# Patient Record
Sex: FEMALE | Race: WHITE | Employment: OTHER | URBAN - METROPOLITAN AREA
[De-identification: names, ages, dates, MRNs, and addresses within clinical notes are randomized per-mention and may not be internally consistent; named-entity substitution may affect disease eponyms.]

---

## 2021-10-08 ENCOUNTER — HOSPITAL ENCOUNTER (EMERGENCY)
Age: 86
Discharge: HOME | End: 2021-10-08
Attending: EMERGENCY MEDICINE
Payer: MEDICARE

## 2021-10-08 ENCOUNTER — APPOINTMENT (OUTPATIENT)
Dept: GENERAL RADIOLOGY | Age: 86
End: 2021-10-08
Attending: EMERGENCY MEDICINE
Payer: MEDICARE

## 2021-10-08 ENCOUNTER — APPOINTMENT (OUTPATIENT)
Dept: CT IMAGING | Age: 86
End: 2021-10-08
Attending: EMERGENCY MEDICINE
Payer: MEDICARE

## 2021-10-08 VITALS
HEIGHT: 63 IN | WEIGHT: 123.02 LBS | OXYGEN SATURATION: 97 % | BODY MASS INDEX: 21.8 KG/M2 | SYSTOLIC BLOOD PRESSURE: 159 MMHG | RESPIRATION RATE: 20 BRPM | DIASTOLIC BLOOD PRESSURE: 56 MMHG | TEMPERATURE: 97.5 F | HEART RATE: 68 BPM
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
## 2021-10-08 LAB
ALBUMIN SERPL-MCNC: 3.8 G/DL (ref 3.5–5)
ALBUMIN/GLOB SERPL: 1.1 {RATIO} (ref 1.1–2.2)
ALP SERPL-CCNC: 65 U/L (ref 45–117)
ALT SERPL-CCNC: 15 U/L (ref 12–78)
ANION GAP SERPL CALC-SCNC: 6 MMOL/L (ref 5–15)
APPEARANCE UR: ABNORMAL
AST SERPL-CCNC: 20 U/L (ref 15–37)
ATRIAL RATE: 63 BPM
BACTERIA URNS QL MICRO: NEGATIVE /HPF
BASOPHILS # BLD: 0.1 K/UL (ref 0–0.1)
BASOPHILS NFR BLD: 1 % (ref 0–1)
BILIRUB SERPL-MCNC: 0.6 MG/DL (ref 0.2–1)
BILIRUB UR QL: NEGATIVE
BUN SERPL-MCNC: 15 MG/DL (ref 6–20)
BUN/CREAT SERPL: 25 (ref 12–20)
CALCIUM SERPL-MCNC: 9.9 MG/DL (ref 8.5–10.1)
CALCULATED P AXIS, ECG09: 62 DEGREES
CALCULATED R AXIS, ECG10: 30 DEGREES
CALCULATED T AXIS, ECG11: 52 DEGREES
CHLORIDE SERPL-SCNC: 110 MMOL/L (ref 97–108)
CO2 SERPL-SCNC: 25 MMOL/L (ref 21–32)
COLOR UR: ABNORMAL
COMMENT, HOLDF: NORMAL
CREAT SERPL-MCNC: 0.61 MG/DL (ref 0.55–1.02)
DIAGNOSIS, 93000: NORMAL
DIFFERENTIAL METHOD BLD: ABNORMAL
EOSINOPHIL # BLD: 0.2 K/UL (ref 0–0.4)
EOSINOPHIL NFR BLD: 3 % (ref 0–7)
EPITH CASTS URNS QL MICRO: ABNORMAL /LPF
ERYTHROCYTE [DISTWIDTH] IN BLOOD BY AUTOMATED COUNT: 13.7 % (ref 11.5–14.5)
GLOBULIN SER CALC-MCNC: 3.5 G/DL (ref 2–4)
GLUCOSE SERPL-MCNC: 111 MG/DL (ref 65–100)
GLUCOSE UR STRIP.AUTO-MCNC: NEGATIVE MG/DL
HCT VFR BLD AUTO: 39.1 % (ref 35–47)
HGB BLD-MCNC: 12.7 G/DL (ref 11.5–16)
HGB UR QL STRIP: NEGATIVE
HYALINE CASTS URNS QL MICRO: ABNORMAL /LPF (ref 0–5)
IMM GRANULOCYTES # BLD AUTO: 0 K/UL (ref 0–0.04)
IMM GRANULOCYTES NFR BLD AUTO: 0 % (ref 0–0.5)
KETONES UR QL STRIP.AUTO: ABNORMAL MG/DL
LEUKOCYTE ESTERASE UR QL STRIP.AUTO: NEGATIVE
LYMPHOCYTES # BLD: 1 K/UL (ref 0.8–3.5)
LYMPHOCYTES NFR BLD: 14 % (ref 12–49)
MCH RBC QN AUTO: 30.3 PG (ref 26–34)
MCHC RBC AUTO-ENTMCNC: 32.5 G/DL (ref 30–36.5)
MCV RBC AUTO: 93.3 FL (ref 80–99)
MONOCYTES # BLD: 0.4 K/UL (ref 0–1)
MONOCYTES NFR BLD: 6 % (ref 5–13)
NEUTS SEG # BLD: 5.3 K/UL (ref 1.8–8)
NEUTS SEG NFR BLD: 76 % (ref 32–75)
NITRITE UR QL STRIP.AUTO: NEGATIVE
NRBC # BLD: 0 K/UL (ref 0–0.01)
NRBC BLD-RTO: 0 PER 100 WBC
P-R INTERVAL, ECG05: 176 MS
PH UR STRIP: 7.5 [PH] (ref 5–8)
PLATELET # BLD AUTO: 210 K/UL (ref 150–400)
PMV BLD AUTO: 11 FL (ref 8.9–12.9)
POTASSIUM SERPL-SCNC: 4.3 MMOL/L (ref 3.5–5.1)
PROT SERPL-MCNC: 7.3 G/DL (ref 6.4–8.2)
PROT UR STRIP-MCNC: NEGATIVE MG/DL
Q-T INTERVAL, ECG07: 418 MS
QRS DURATION, ECG06: 76 MS
QTC CALCULATION (BEZET), ECG08: 427 MS
RBC # BLD AUTO: 4.19 M/UL (ref 3.8–5.2)
RBC #/AREA URNS HPF: ABNORMAL /HPF (ref 0–5)
SAMPLES BEING HELD,HOLD: NORMAL
SODIUM SERPL-SCNC: 141 MMOL/L (ref 136–145)
SP GR UR REFRACTOMETRY: 1.01 (ref 1–1.03)
TROPONIN-HIGH SENSITIVITY: 10 NG/L (ref 0–51)
TROPONIN-HIGH SENSITIVITY: 11 NG/L (ref 0–51)
UR CULT HOLD, URHOLD: NORMAL
UROBILINOGEN UR QL STRIP.AUTO: 0.2 EU/DL (ref 0.2–1)
VENTRICULAR RATE, ECG03: 63 BPM
WBC # BLD AUTO: 6.9 K/UL (ref 3.6–11)
WBC URNS QL MICRO: ABNORMAL /HPF (ref 0–4)

## 2021-10-08 PROCEDURE — 93005 ELECTROCARDIOGRAM TRACING: CPT

## 2021-10-08 PROCEDURE — 80053 COMPREHEN METABOLIC PANEL: CPT

## 2021-10-08 PROCEDURE — 70450 CT HEAD/BRAIN W/O DYE: CPT

## 2021-10-08 PROCEDURE — 73610 X-RAY EXAM OF ANKLE: CPT

## 2021-10-08 PROCEDURE — 99285 EMERGENCY DEPT VISIT HI MDM: CPT

## 2021-10-08 PROCEDURE — 81001 URINALYSIS AUTO W/SCOPE: CPT

## 2021-10-08 PROCEDURE — 72125 CT NECK SPINE W/O DYE: CPT

## 2021-10-08 PROCEDURE — 71045 X-RAY EXAM CHEST 1 VIEW: CPT

## 2021-10-08 PROCEDURE — 36415 COLL VENOUS BLD VENIPUNCTURE: CPT

## 2021-10-08 PROCEDURE — 85025 COMPLETE CBC W/AUTO DIFF WBC: CPT

## 2021-10-08 PROCEDURE — 84484 ASSAY OF TROPONIN QUANT: CPT

## 2021-10-08 NOTE — ED NOTES
This is a 66-year-old female patient who came into the ER with a complaint of being found on the floor today. The patient was also reported to have \"had a vagal response at home\" on Sept 17th, where the patient \"went limp\" and was cold. The patient was taken to the [facility] for that episode.

## 2021-10-08 NOTE — ED TRIAGE NOTES
Pt arrives via EMS after a family found her on the floor next to a lamp that been knocked down. They report she had a fall at some point and was found around 9am this morning. Unsure how long she was down, if she hit her head or if she lost consciousness. Pt only take 81 mg ASA. Pt reports she is hurting but unsure where. Pt has hx of dementia. Pts son at bedside. Pts baselines alert to self. PTs son reports dementia has gotten worse over the last couple of months.

## 2021-10-08 NOTE — ED PROVIDER NOTES
The history is provided by the patient and a relative. The history is limited by the condition of the patient. No  was used. Fall  The accident occurred 1 to 2 hours ago. The fall occurred in unknown circumstances. She fell from a height of ground level. She landed on carpet. There was no blood loss. She was not ambulatory at the scene. There was no entrapment after the fall. There was no alcohol use involved in the accident. The risk factors include dementia and being elderly. She has tried nothing for the symptoms. The treatment provided no relief. It is unknown when the patient last had a tetanus shot. No past medical history on file. No past surgical history on file. No family history on file. Social History     Socioeconomic History    Marital status:      Spouse name: Not on file    Number of children: Not on file    Years of education: Not on file    Highest education level: Not on file   Occupational History    Not on file   Tobacco Use    Smoking status: Not on file   Substance and Sexual Activity    Alcohol use: Not on file    Drug use: Not on file    Sexual activity: Not on file   Other Topics Concern    Not on file   Social History Narrative    Not on file     Social Determinants of Health     Financial Resource Strain:     Difficulty of Paying Living Expenses:    Food Insecurity:     Worried About Running Out of Food in the Last Year:     [address] in the Last Year:    Transportation Needs:     Lack of Transportation (Medical):      Lack of Transportation (Non-Medical):    Physical Activity:     Days of Exercise per Week:     Minutes of Exercise per Session:    Stress:     Feeling of Stress :    Social Connections:     Frequency of Communication with Friends and Family:     Frequency of Social Gatherings with Friends and Family:     Attends Restoration[employer] Services:     Active Member of Clubs or Organizations:     Attends Club or Organization Meetings:     Marital Status:    Intimate Partner Violence:     Fear of Current or Ex-Partner:     Emotionally Abused:     Physically Abused:     Sexually Abused: ALLERGIES: Patient has no known allergies. Review of Systems   Unable to perform ROS: Dementia       Vitals:    10/08/21 1018 10/08/21 1025   BP: (!) 180/72    Pulse: 61    Resp: 22    Temp: 97.5 °F (36.4 °C)    SpO2: 98% 98%   Weight: 95.7 kg (211 lb)             Physical Exam  Vitals and nursing note reviewed. Constitutional:       General: She is not in acute distress. Appearance: She is well-developed. She is not diaphoretic. HENT:      Head: Normocephalic and atraumatic. Eyes:      Pupils: Pupils are equal, round, and reactive to light. Cardiovascular:      Rate and Rhythm: Normal rate and regular rhythm. Heart sounds: Normal heart sounds. No murmur heard. No friction rub. No gallop. Pulmonary:      Effort: Pulmonary effort is normal. No respiratory distress. Breath sounds: Normal breath sounds. No wheezing. Abdominal:      General: Bowel sounds are normal. There is no distension. Palpations: Abdomen is soft. Tenderness: There is no abdominal tenderness. There is no guarding or rebound. Musculoskeletal:      Cervical back: Normal range of motion and neck supple. Left ankle: No swelling, deformity, ecchymosis or lacerations. Tenderness present. Normal range of motion. Skin:     General: Skin is warm. Findings: No rash. Neurological:      Mental Status: She is alert and oriented to person, place, and time. Psychiatric:         Behavior: Behavior normal.         Thought Content: Thought content normal.         Judgment: Judgment normal.          MDM     This is a 80-year-old female with past medical history, review of systems, physical exam as above, presenting with complaints of ground-level fall.   Per son at bedside, patient lives with them, history of dementia, noted to having increasing weakness and confusion over the last several days. He states his mother was found on her back in her bedroom, and it appeared she had pulled a lamp down with her as she went to the floor. She was reported to be awake and alert, without complaints, questionable hip pain when moved by EMS. Upon arrival patient awake and alert, at baseline per her son, hard of hearing and demented, making review of systems limited, however she denies complaints. Physical exam remarkable for well-appearing elderly female, in no acute distress noted to be hypertensive, afebrile without tachycardia, satting well on room air. Free range of motion without pain or crepitus with the exception of left ankle with pain with range of motion, without obvious deformity, ecchymosis, or crepitus. She is noted to have tenderness over the suprapubic space stating \"I got a pee\". She has an otherwise benign abdomen, clear breath sounds, regular rate and rhythm without murmurs gallops rubs, pupils equal and reactive, extraocular movements intact. No inducible hip pain upon range of motion bilateral hips or palpation. Plan to obtain CMP, CBC, EKG, cardiac enzymes, UA, chest x-ray, head CT, cervical spine CT. Left ankle films. We will reassess, and make a disposition.     Procedures

## 2021-10-26 ENCOUNTER — TELEPHONE (OUTPATIENT)
Dept: INTERNAL MEDICINE CLINIC | Age: 86
End: 2021-10-26

## 2021-10-26 NOTE — TELEPHONE ENCOUNTER
FYI: PSR reached out to patient to reschedule her new patient appointment - I ended up speaking to her daughter in law who is providing transportation [[email or web address]]. Mally Messina did not answer so I left a detailed VM about moving new patient appointment to Nov 05 at 3 PM - if she returns the call please assist in rescheduling.

## 2021-11-05 ENCOUNTER — OFFICE VISIT (OUTPATIENT)
Dept: INTERNAL MEDICINE CLINIC | Age: 86
End: 2021-11-05
Payer: MEDICARE

## 2021-11-05 VITALS
WEIGHT: 127 LBS | OXYGEN SATURATION: 97 % | HEIGHT: 63 IN | DIASTOLIC BLOOD PRESSURE: 64 MMHG | RESPIRATION RATE: 13 BRPM | BODY MASS INDEX: 22.5 KG/M2 | SYSTOLIC BLOOD PRESSURE: 134 MMHG | TEMPERATURE: 97.2 F | HEART RATE: 83 BPM

## 2021-11-05 DIAGNOSIS — M81.0 AGE RELATED OSTEOPOROSIS, UNSPECIFIED PATHOLOGICAL FRACTURE PRESENCE: ICD-10-CM

## 2021-11-05 DIAGNOSIS — R32 URINARY INCONTINENCE, UNSPECIFIED TYPE: ICD-10-CM

## 2021-11-05 DIAGNOSIS — R06.09 DYSPNEA ON EXERTION: ICD-10-CM

## 2021-11-05 DIAGNOSIS — Z86.19 HISTORY OF ONYCHOMYCOSIS: ICD-10-CM

## 2021-11-05 DIAGNOSIS — R10.2 VAGINAL PAIN: ICD-10-CM

## 2021-11-05 DIAGNOSIS — G30.1 LATE ONSET ALZHEIMER'S DEMENTIA WITHOUT BEHAVIORAL DISTURBANCE (HCC): ICD-10-CM

## 2021-11-05 DIAGNOSIS — R55 NEAR SYNCOPE: ICD-10-CM

## 2021-11-05 DIAGNOSIS — U07.1 COVID-19: ICD-10-CM

## 2021-11-05 DIAGNOSIS — R01.1 MURMUR: Primary | ICD-10-CM

## 2021-11-05 DIAGNOSIS — E21.3 HYPERPARATHYROIDISM (HCC): ICD-10-CM

## 2021-11-05 DIAGNOSIS — H35.30 MACULAR DEGENERATION, UNSPECIFIED LATERALITY, UNSPECIFIED TYPE: ICD-10-CM

## 2021-11-05 DIAGNOSIS — F02.80 LATE ONSET ALZHEIMER'S DEMENTIA WITHOUT BEHAVIORAL DISTURBANCE (HCC): ICD-10-CM

## 2021-11-05 PROCEDURE — G8420 CALC BMI NORM PARAMETERS: HCPCS | Performed by: INTERNAL MEDICINE

## 2021-11-05 PROCEDURE — 1090F PRES/ABSN URINE INCON ASSESS: CPT | Performed by: INTERNAL MEDICINE

## 2021-11-05 PROCEDURE — G8427 DOCREV CUR MEDS BY ELIG CLIN: HCPCS | Performed by: INTERNAL MEDICINE

## 2021-11-05 PROCEDURE — G8536 NO DOC ELDER MAL SCRN: HCPCS | Performed by: INTERNAL MEDICINE

## 2021-11-05 PROCEDURE — G8510 SCR DEP NEG, NO PLAN REQD: HCPCS | Performed by: INTERNAL MEDICINE

## 2021-11-05 PROCEDURE — 99204 OFFICE O/P NEW MOD 45 MIN: CPT | Performed by: INTERNAL MEDICINE

## 2021-11-05 PROCEDURE — G0463 HOSPITAL OUTPT CLINIC VISIT: HCPCS | Performed by: INTERNAL MEDICINE

## 2021-11-05 PROCEDURE — 1101F PT FALLS ASSESS-DOCD LE1/YR: CPT | Performed by: INTERNAL MEDICINE

## 2021-11-05 RX ORDER — DOCUSATE SODIUM 100 MG/1
100 CAPSULE, LIQUID FILLED ORAL DAILY
COMMUNITY
End: 2022-09-08

## 2021-11-05 RX ORDER — CHOLECALCIFEROL (VITAMIN D3) 125 MCG
CAPSULE ORAL DAILY
COMMUNITY

## 2021-11-05 RX ORDER — ALENDRONATE SODIUM 70 MG/1
TABLET ORAL
COMMUNITY
End: 2022-09-08

## 2021-11-05 RX ORDER — ASPIRIN 81 MG/1
TABLET ORAL DAILY
COMMUNITY
End: 2022-09-08

## 2021-11-05 RX ORDER — ASCORBIC ACID 250 MG
TABLET ORAL
COMMUNITY
End: 2022-09-08

## 2021-11-05 NOTE — ASSESSMENT & PLAN NOTE
Daughter reports that when she helps clean the patient, she will occasionally complain of tenderness to her vaginal area. She was previously prescribed Premarin cream but started bleeding so they stopped. Daughter reports recently nearing target discharge on her depends. She does not think this is blood. Recommend monitoring.   If discharge persists, advised to let us know for further evaluation

## 2021-11-05 NOTE — ASSESSMENT & PLAN NOTE
Had an episode in September where she was having a lot of diarrhea and started feeling lightheaded, dizzy, warm. Heart rate noted at that time to be 49. She was seen in the emergency room with negative work-up and was diagnosed as vasovagal.  A few weeks later, she was brought to the ER again after being found on the floor near her bed. Work-up was also negative. Unclear etiology. Recommend maintaining hydration. Does have a murmur that has not been mentioned before. Recommend checking echo.

## 2021-11-05 NOTE — ASSESSMENT & PLAN NOTE
Daughter reports noting dyspnea on exertion over the past couple months. She was diagnosed with Covid in January and was doing well until about 2 months ago when her physical therapy stopped. After therapy stopped, she has been less active and when she is active, she seems more short of breath. Reports an occasional cough. Denies lower extremity swelling, wheezing. Likely related to deconditioning. However, given possible new murmur, will check echo. Otherwise, we discussed whether to pursue further work-up. Daughter-in-law okay with holding off for now.

## 2021-11-05 NOTE — ASSESSMENT & PLAN NOTE
After her admission for Covid in January, she started living with her son and daughter-in-law. They started noticing memory deficits. They did not have much contact with her during [place] so they are not sure how quickly this may have developed. They had initially attributed this to hearing issues. She eventually saw Dr. Yesi Hernandez with neurology and was diagnosed with Alzheimer's. She was initially given donepezil but that caused dizziness so they stopped the medication. Reports noting some swallowing issues and has been seen by speech therapy and they think this is related to cognitive issues.   Monitor

## 2021-11-05 NOTE — PROGRESS NOTES
Assessment and Plan     Accompanied by her daughter in law Zara Parmar. Majority of history obtained from [facility] - pt extremely hard of hearing    1. Murmur  Assessment & Plan:  Systolic murmur noted on exam.  Daughter-in-law denies anyone mentioning this in previous visits. Daughter-in-law reports that she will occasionally complain of feeling dizzy in the morning. Given her history of recent dyspnea on exertion and vasovagal symptoms, recommend checking echo  Orders:  -     ECHO ADULT COMPLETE; Future  2. Near syncope  Assessment & Plan:  Had an episode in September where she was having a lot of diarrhea and started feeling lightheaded, dizzy, warm. Heart rate noted at that time to be 49. She was seen in the emergency room with negative work-up and was diagnosed as vasovagal.  A few weeks later, she was brought to the ER again after being found on the floor near her bed. Work-up was also negative. Unclear etiology. Recommend maintaining hydration. Does have a murmur that has not been mentioned before. Recommend checking echo. 3. Dyspnea on exertion  Assessment & Plan:  Daughter reports noting dyspnea on exertion over the past couple months. She was diagnosed with Covid in January and was doing well until about 2 months ago when her physical therapy stopped. After therapy stopped, she has been less active and when she is active, she seems more short of breath. Reports an occasional cough. Denies lower extremity swelling, wheezing. Likely related to deconditioning. However, given possible new murmur, will check echo. Otherwise, we discussed whether to pursue further work-up. Daughter-in-law okay with holding off for now. 4. COVID-19  Assessment & Plan:  Diagnosed with Covid in January. Did require admission due to dehydration.   5. Late onset Alzheimer's dementia without behavioral disturbance [facility])  Assessment & Plan:  After her admission for Covid in January, she started living with her son and daughter-in-law. They started noticing memory deficits. They did not have much contact with her during [place] so they are not sure how quickly this may have developed. They had initially attributed this to hearing issues. She eventually saw Dr. Bhanu Capps with neurology and was diagnosed with Alzheimer's. She was initially given donepezil but that caused dizziness so they stopped the medication. Reports noting some swallowing issues and has been seen by speech therapy and they think this is related to cognitive issues. Monitor  6. Macular degeneration, unspecified laterality, unspecified type  Assessment & Plan:  Receives injections. Followed by ophthalmology  7. Vaginal pain  Assessment & Plan:  Daughter reports that when she helps clean the patient, she will occasionally complain of tenderness to her vaginal area. She was previously prescribed Premarin cream but started bleeding so they stopped. Daughter reports recently nearing target discharge on her depends. She does not think this is blood. Recommend monitoring. If discharge persists, advised to let us know for further evaluation  8. Hyperparathyroidism [facility])  Assessment & Plan:  Followed by endocrinology. Continue to monitor. They are planning on a bone density scan. 9. Age related osteoporosis, unspecified pathological fracture presence  Assessment & Plan:  Planning for bone density scan soon with Dr. Jeremy Mario. Continue alendronate 70 mg weekly  10. History of onychomycosis  Assessment & Plan:  Previously seen by podiatry and was treated for onychomycosis    11. Urinary incontinence, unspecified type  Assessment & Plan:  Occasional, uses depends       Benefits, risks, possible drug interactions, and side effects of all new medications were reviewed with the patient. Pt verbalized understanding. Return to clinic:  Pending workup    An electronic signature was used to authenticate this note.   Zuleika Loomis MD  Internal Medicine [facility]  11/5/2021    No future appointments. History of Present Illness   Chief Complaint   establish care    Rodo Carlos is a 80 y.o. female         Review of Systems   Constitutional: Negative for chills and fever. HENT: Positive for hearing loss. Eyes: Negative for blurred vision. Respiratory: Positive for shortness of breath. Cardiovascular: Negative for chest pain. Gastrointestinal: Negative for abdominal pain, blood in stool, constipation, diarrhea, melena, nausea and vomiting. Genitourinary: Negative for dysuria and hematuria. Musculoskeletal: Negative for joint pain. Skin: Negative for rash. Neurological: Negative for headaches. Past Medical History     Allergies   Allergen Reactions    Penicillamine Other (comments)        Current Outpatient Medications   Medication Sig    alendronate (FOSAMAX) 70 mg tablet Take  by mouth.  docusate sodium (COLACE) 100 mg capsule Take 100 mg by mouth daily.  cholecalciferol, vitamin D3, 50 mcg (2,000 unit) tab Take  by mouth daily.  aspirin delayed-release 81 mg tablet Take  by mouth daily.  vit A/vit C/vit E/zinc/copper (PRESERVISION AREDS PO) Take  by mouth.  B.infantis-B.ani-B.long-B.bifi 10-15 mg TbEC Take  by mouth.  ascorbic acid, vitamin C, (VITAMIN C) 250 mg tablet Take  by mouth.  FIBER CHOICE PO Take  by mouth every evening. No current facility-administered medications for this visit. Patient Active Problem List   Diagnosis Code    Murmur R01.1    COVID-19 U07.1    Late onset Alzheimer's dementia without behavioral disturbance ([address] G30.1, F02.80    Macular degeneration H35.30    Vaginal pain R10.2    Hyperparathyroidism ([address] E21.3    Osteoporosis M81.0    History of onychomycosis Z86.19    Urinary incontinence R32    Dyspnea on exertion R06.00    Near syncope R55     History reviewed. No pertinent surgical history.    Social History     Tobacco Use    Smoking status: Never Smoker    Smokeless tobacco: Never Used   Substance Use Topics    Alcohol use: Not Currently      Family History   Problem Relation Age of Onset    Dementia Other     Diabetes Other         Physical Exam   Vitals:       Visit Vitals  /64   Pulse 83   Temp 97.2 °F (36.2 °C) (Temporal)   Resp 13   Ht 5' 3\" (1.6 m)   Wt 127 lb (57.6 kg)   SpO2 97%   BMI 22.50 kg/m²        Physical Exam  Constitutional:       General: She is not in acute distress. Appearance: She is well-developed. HENT:      Right Ear: External ear normal. There is impacted cerumen. Left Ear: External ear normal. There is impacted cerumen. Eyes:      Extraocular Movements: Extraocular movements intact. Conjunctiva/sclera: Conjunctivae normal.   Cardiovascular:      Rate and Rhythm: Normal rate and regular rhythm. Pulses: Normal pulses. Heart sounds: Murmur (3/6 systolic murmur) heard. No friction rub. No gallop. Pulmonary:      Effort: No respiratory distress. Breath sounds: No wheezing, rhonchi or rales. Abdominal:      General: Bowel sounds are normal. There is no distension. Palpations: Abdomen is soft. There is no hepatomegaly, splenomegaly or mass. Tenderness: There is no abdominal tenderness. There is no guarding. Musculoskeletal:      Cervical back: Neck supple. Skin:     General: Skin is warm. Findings: No rash. Neurological:      Mental Status: She is alert.

## 2022-02-15 ENCOUNTER — HOSPITAL ENCOUNTER (OUTPATIENT)
Dept: NON INVASIVE DIAGNOSTICS | Age: 87
Discharge: HOME | End: 2022-02-15
Attending: INTERNAL MEDICINE
Payer: MEDICARE

## 2022-02-15 VITALS
BODY MASS INDEX: 22.5 KG/M2 | HEIGHT: 63 IN | WEIGHT: 127 LBS | SYSTOLIC BLOOD PRESSURE: 134 MMHG | DIASTOLIC BLOOD PRESSURE: 64 MMHG

## 2022-02-15 DIAGNOSIS — R01.1 MURMUR: ICD-10-CM

## 2022-02-15 LAB
ECHO AO ROOT DIAM: 3.2 CM
ECHO AO ROOT INDEX: 2.01 CM/M2
ECHO AV AREA PEAK VELOCITY: 2.5 CM2
ECHO AV AREA PEAK VELOCITY: 2.5 CM2
ECHO AV PEAK GRADIENT: 10 MMHG
ECHO AV PEAK VELOCITY: 1.6 M/S
ECHO AV VELOCITY RATIO: 0.69
ECHO EST RA PRESSURE: 8 MMHG
ECHO LA DIAMETER INDEX: 2.45 CM/M2
ECHO LA DIAMETER: 3.9 CM
ECHO LA TO AORTIC ROOT RATIO: 1.22
ECHO LA VOL 2C: 44 ML (ref 22–52)
ECHO LA VOL 4C: 30 ML (ref 22–52)
ECHO LA VOL BP: 37 ML (ref 22–52)
ECHO LA VOL BP: 37 ML (ref 22–52)
ECHO LA VOLUME AREA LENGTH: 40 ML
ECHO LA VOLUME INDEX A2C: 28 ML/M2 (ref 16–34)
ECHO LA VOLUME INDEX A4C: 19 ML/M2 (ref 16–34)
ECHO LA VOLUME INDEX AREA LENGTH: 25 ML/M2 (ref 16–34)
ECHO LV E' LATERAL VELOCITY: 7 CM/S
ECHO LV E' SEPTAL VELOCITY: 7 CM/S
ECHO LV FRACTIONAL SHORTENING: 19 % (ref 28–44)
ECHO LV INTERNAL DIMENSION DIASTOLE INDEX: 2.64 CM/M2
ECHO LV INTERNAL DIMENSION DIASTOLIC: 4.2 CM (ref 3.9–5.3)
ECHO LV INTERNAL DIMENSION SYSTOLIC INDEX: 2.14 CM/M2
ECHO LV INTERNAL DIMENSION SYSTOLIC: 3.4 CM
ECHO LV IVSD: 1.3 CM (ref 0.6–0.9)
ECHO LV IVSS: 1.3 CM
ECHO LV MASS 2D: 157.1 G (ref 67–162)
ECHO LV MASS INDEX 2D: 98.8 G/M2 (ref 43–95)
ECHO LV POSTERIOR WALL DIASTOLIC: 0.9 CM (ref 0.6–0.9)
ECHO LV POSTERIOR WALL SYSTOLIC: 1.2 CM
ECHO LV RELATIVE WALL THICKNESS RATIO: 0.43
ECHO LVOT AREA: 3.5 CM2
ECHO LVOT DIAM: 2.1 CM
ECHO LVOT PEAK GRADIENT: 5 MMHG
ECHO LVOT PEAK VELOCITY: 1.1 M/S
ECHO MV A VELOCITY: 1.15 M/S
ECHO MV E DECELERATION TIME (DT): 354.4 MS
ECHO MV E VELOCITY: 0.89 M/S
ECHO MV E/A RATIO: 0.77
ECHO MV E/E' LATERAL: 12.71
ECHO MV E/E' RATIO (AVERAGED): 12.71
ECHO MV E/E' SEPTAL: 12.71
ECHO MV MAX VELOCITY: 1.5 M/S
ECHO MV MEAN GRADIENT: 3 MMHG
ECHO MV MEAN VELOCITY: 0.8 M/S
ECHO MV PEAK GRADIENT: 9 MMHG
ECHO MV REGURGITANT PEAK GRADIENT: 100 MMHG
ECHO MV REGURGITANT PEAK VELOCITY: 5 M/S
ECHO MV VTI: 36.4 CM
ECHO PV MAX VELOCITY: 1.4 M/S
ECHO PV PEAK GRADIENT: 7 MMHG
ECHO RIGHT VENTRICULAR SYSTOLIC PRESSURE (RVSP): 34 MMHG
ECHO RV FREE WALL PEAK S': 12 CM/S
ECHO RV INTERNAL DIMENSION: 5.1 CM
ECHO RV TAPSE: 2.5 CM (ref 1.5–2)
ECHO TV REGURGITANT MAX VELOCITY: 2.55 M/S
ECHO TV REGURGITANT PEAK GRADIENT: 26 MMHG

## 2022-02-15 PROCEDURE — 93306 TTE W/DOPPLER COMPLETE: CPT

## 2022-02-15 PROCEDURE — 93306 TTE W/DOPPLER COMPLETE: CPT | Performed by: SPECIALIST

## 2022-02-16 ENCOUNTER — PATIENT MESSAGE (OUTPATIENT)
Dept: INTERNAL MEDICINE CLINIC | Age: 87
End: 2022-02-16

## 2022-02-16 ENCOUNTER — TELEPHONE (OUTPATIENT)
Dept: INTERNAL MEDICINE CLINIC | Age: 87
End: 2022-02-16

## 2022-02-16 NOTE — TELEPHONE ENCOUNTER
----- Message from Darien Tate MD sent at 1/49/5097  8:34 AM EST -----  Please call the pt/pt's daughter in law and let her daughter-in-law know that her echo showed normal heart function. No significant valve issues. Her shortness of breath is likely related to deconditioning.   Recommend 6-month follow-up for Medicare visit

## 2022-02-16 NOTE — TELEPHONE ENCOUNTER
Incoming call from patient daughter in law. Results from echo given and recommendations. appt scheduled for medicare wellness. Daughter in law would like to get recommendation on continuing baby Asprin and vit D, she is trying to eliminate patient medications d/t patient forgetting to swallow, please advise of if okay to eliminate medications from daily medications to take.

## 2022-02-16 NOTE — PROGRESS NOTES
Please call the pt/pt's daughter in law and let her daughter-in-law know that her echo showed normal heart function. No significant valve issues. Her shortness of breath is likely related to deconditioning.   Recommend 6-month follow-up for Medicare visit

## 2022-02-16 NOTE — TELEPHONE ENCOUNTER
Call made to patient daughter in law-- no answer nurse [facility] for a call back to discuss echo results.  ===  echo showed normal heart function.  No significant valve issues.  Her shortness of breath is likely related to deconditioning.  Recommend 6-month follow-up for Medicare visit

## 2022-02-17 NOTE — TELEPHONE ENCOUNTER
Call made to daughter in law no answer nurse lvm of recommendations per pcp. advised for a call back with any questions.

## 2022-02-17 NOTE — TELEPHONE ENCOUNTER
Regarding: FW: No message from patient  Okay to stop aspirin but recommend continuing vitamin D due to osteoporosis to minimize risk of fractures  ----- Message -----  From: Efrain Morrison: 2/16/2022   9:42 AM EST  To: Dmitri Kong MD  Subject: No message from patient                          ----- Message from Candida Restrepo sent at 2/16/2022  9:42 AM EST -----    This encounter does not contain a message from the patient.

## 2022-03-18 PROBLEM — R06.09 DYSPNEA ON EXERTION: Status: ACTIVE | Noted: 2021-11-05

## 2022-03-18 PROBLEM — E21.3 HYPERPARATHYROIDISM (HCC): Status: ACTIVE | Noted: 2021-11-05

## 2022-03-18 PROBLEM — M81.0 OSTEOPOROSIS: Status: ACTIVE | Noted: 2021-11-05

## 2022-03-19 PROBLEM — U07.1 COVID-19: Status: ACTIVE | Noted: 2021-11-05

## 2022-03-19 PROBLEM — H35.30 MACULAR DEGENERATION: Status: ACTIVE | Noted: 2021-11-05

## 2022-03-19 PROBLEM — R10.2 VAGINAL PAIN: Status: ACTIVE | Noted: 2021-11-05

## 2022-03-19 PROBLEM — R55 NEAR SYNCOPE: Status: ACTIVE | Noted: 2021-11-05

## 2022-03-19 PROBLEM — Z86.19 HISTORY OF ONYCHOMYCOSIS: Status: ACTIVE | Noted: 2021-11-05

## 2022-03-19 PROBLEM — R01.1 MURMUR: Status: ACTIVE | Noted: 2021-11-05

## 2022-03-20 PROBLEM — G30.1 LATE ONSET ALZHEIMER'S DEMENTIA WITHOUT BEHAVIORAL DISTURBANCE (HCC): Status: ACTIVE | Noted: 2021-11-05

## 2022-03-20 PROBLEM — R32 URINARY INCONTINENCE: Status: ACTIVE | Noted: 2021-11-05

## 2022-03-20 PROBLEM — F02.80 LATE ONSET ALZHEIMER'S DEMENTIA WITHOUT BEHAVIORAL DISTURBANCE (HCC): Status: ACTIVE | Noted: 2021-11-05

## 2022-04-16 ENCOUNTER — APPOINTMENT (OUTPATIENT)
Dept: CT IMAGING | Age: 87
End: 2022-04-16
Attending: EMERGENCY MEDICINE
Payer: MEDICARE

## 2022-04-16 ENCOUNTER — APPOINTMENT (OUTPATIENT)
Dept: GENERAL RADIOLOGY | Age: 87
End: 2022-04-16
Attending: EMERGENCY MEDICINE
Payer: MEDICARE

## 2022-04-16 ENCOUNTER — APPOINTMENT (OUTPATIENT)
Dept: ULTRASOUND IMAGING | Age: 87
End: 2022-04-16
Attending: EMERGENCY MEDICINE
Payer: MEDICARE

## 2022-04-16 ENCOUNTER — HOSPITAL ENCOUNTER (EMERGENCY)
Age: 87
Discharge: HOME | End: 2022-04-16
Attending: EMERGENCY MEDICINE
Payer: MEDICARE

## 2022-04-16 VITALS
SYSTOLIC BLOOD PRESSURE: 171 MMHG | BODY MASS INDEX: 22.15 KG/M2 | HEIGHT: 63 IN | HEART RATE: 68 BPM | WEIGHT: 125 LBS | OXYGEN SATURATION: 97 % | DIASTOLIC BLOOD PRESSURE: 77 MMHG | RESPIRATION RATE: 16 BRPM | TEMPERATURE: 97.2 F

## 2022-04-16 DIAGNOSIS — R10.10 UPPER ABDOMINAL PAIN: Primary | ICD-10-CM

## 2022-04-16 LAB
ALBUMIN SERPL-MCNC: 4 G/DL (ref 3.5–5)
ALBUMIN/GLOB SERPL: 1.3 {RATIO} (ref 1.1–2.2)
ALP SERPL-CCNC: 62 U/L (ref 45–117)
ALT SERPL-CCNC: 13 U/L (ref 12–78)
ANION GAP SERPL CALC-SCNC: 2 MMOL/L (ref 5–15)
APPEARANCE UR: ABNORMAL
AST SERPL-CCNC: 14 U/L (ref 15–37)
BACTERIA URNS QL MICRO: ABNORMAL /HPF
BASOPHILS # BLD: 0 K/UL (ref 0–0.1)
BASOPHILS NFR BLD: 1 % (ref 0–1)
BILIRUB SERPL-MCNC: 0.4 MG/DL (ref 0.2–1)
BILIRUB UR QL: NEGATIVE
BUN SERPL-MCNC: 15 MG/DL (ref 6–20)
BUN/CREAT SERPL: 21 (ref 12–20)
CALCIUM SERPL-MCNC: 10.5 MG/DL (ref 8.5–10.1)
CAOX CRY URNS QL MICRO: ABNORMAL
CHLORIDE SERPL-SCNC: 112 MMOL/L (ref 97–108)
CO2 SERPL-SCNC: 26 MMOL/L (ref 21–32)
COLOR UR: YELLOW
CREAT SERPL-MCNC: 0.72 MG/DL (ref 0.55–1.02)
DIFFERENTIAL METHOD BLD: NORMAL
EOSINOPHIL # BLD: 0.1 K/UL (ref 0–0.4)
EOSINOPHIL NFR BLD: 3 % (ref 0–7)
EPITH CASTS URNS QL MICRO: ABNORMAL /LPF
ERYTHROCYTE [DISTWIDTH] IN BLOOD BY AUTOMATED COUNT: 13.6 % (ref 11.5–14.5)
GLOBULIN SER CALC-MCNC: 3.2 G/DL (ref 2–4)
GLUCOSE SERPL-MCNC: 103 MG/DL (ref 65–100)
GLUCOSE UR STRIP.AUTO-MCNC: NEGATIVE MG/DL
HCT VFR BLD AUTO: 37.1 % (ref 35–47)
HGB BLD-MCNC: 12.1 G/DL (ref 11.5–16)
HGB UR QL STRIP: NEGATIVE
IMM GRANULOCYTES # BLD AUTO: 0 K/UL (ref 0–0.04)
IMM GRANULOCYTES NFR BLD AUTO: 0 % (ref 0–0.5)
KETONES UR QL STRIP.AUTO: ABNORMAL MG/DL
LEUKOCYTE ESTERASE UR QL STRIP.AUTO: ABNORMAL
LIPASE SERPL-CCNC: 183 U/L (ref 73–393)
LYMPHOCYTES # BLD: 1 K/UL (ref 0.8–3.5)
LYMPHOCYTES NFR BLD: 24 % (ref 12–49)
MCH RBC QN AUTO: 30.7 PG (ref 26–34)
MCHC RBC AUTO-ENTMCNC: 32.6 G/DL (ref 30–36.5)
MCV RBC AUTO: 94.2 FL (ref 80–99)
MONOCYTES # BLD: 0.3 K/UL (ref 0–1)
MONOCYTES NFR BLD: 7 % (ref 5–13)
NEUTS SEG # BLD: 2.8 K/UL (ref 1.8–8)
NEUTS SEG NFR BLD: 65 % (ref 32–75)
NITRITE UR QL STRIP.AUTO: NEGATIVE
NRBC # BLD: 0 K/UL (ref 0–0.01)
NRBC BLD-RTO: 0 PER 100 WBC
PH UR STRIP: 5.5 [PH] (ref 5–8)
PLATELET # BLD AUTO: 215 K/UL (ref 150–400)
PMV BLD AUTO: 10.7 FL (ref 8.9–12.9)
POTASSIUM SERPL-SCNC: 4.2 MMOL/L (ref 3.5–5.1)
PROT SERPL-MCNC: 7.2 G/DL (ref 6.4–8.2)
PROT UR STRIP-MCNC: ABNORMAL MG/DL
RBC # BLD AUTO: 3.94 M/UL (ref 3.8–5.2)
RBC #/AREA URNS HPF: ABNORMAL /HPF (ref 0–5)
SODIUM SERPL-SCNC: 140 MMOL/L (ref 136–145)
SP GR UR REFRACTOMETRY: 1.02 (ref 1–1.03)
TROPONIN-HIGH SENSITIVITY: 8 NG/L (ref 0–51)
TROPONIN-HIGH SENSITIVITY: 9 NG/L (ref 0–51)
UR CULT HOLD, URHOLD: NORMAL
UROBILINOGEN UR QL STRIP.AUTO: 1 EU/DL (ref 0.2–1)
WBC # BLD AUTO: 4.3 K/UL (ref 3.6–11)
WBC URNS QL MICRO: ABNORMAL /HPF (ref 0–4)

## 2022-04-16 PROCEDURE — 76705 ECHO EXAM OF ABDOMEN: CPT

## 2022-04-16 PROCEDURE — 93005 ELECTROCARDIOGRAM TRACING: CPT

## 2022-04-16 PROCEDURE — 36415 COLL VENOUS BLD VENIPUNCTURE: CPT

## 2022-04-16 PROCEDURE — 85025 COMPLETE CBC W/AUTO DIFF WBC: CPT

## 2022-04-16 PROCEDURE — 83690 ASSAY OF LIPASE: CPT

## 2022-04-16 PROCEDURE — 80053 COMPREHEN METABOLIC PANEL: CPT

## 2022-04-16 PROCEDURE — 81001 URINALYSIS AUTO W/SCOPE: CPT

## 2022-04-16 PROCEDURE — 71046 X-RAY EXAM CHEST 2 VIEWS: CPT

## 2022-04-16 PROCEDURE — 99285 EMERGENCY DEPT VISIT HI MDM: CPT

## 2022-04-16 PROCEDURE — 74011000636 HC RX REV CODE- 636: Performed by: INTERNAL MEDICINE

## 2022-04-16 PROCEDURE — 74177 CT ABD & PELVIS W/CONTRAST: CPT

## 2022-04-16 PROCEDURE — 84484 ASSAY OF TROPONIN QUANT: CPT

## 2022-04-16 RX ADMIN — IOPAMIDOL 100 ML: 755 INJECTION, SOLUTION INTRAVENOUS at 15:02

## 2022-04-16 NOTE — ED TRIAGE NOTES
Patient arrives with daughter-in-law Chacha Valdez) to triage. Ambulatory to triage with c/o epigastric pain radiating bilaterally. Per daughter-in-law patient complained of RUQ pain and pointed to right flank this morning after showering. Denies N/V, chest pain. Patient has alzheimer's and is poor historian.

## 2022-04-16 NOTE — DISCHARGE INSTRUCTIONS
Return to the emergency department with any worsening symptoms or new symptoms you find concerning. In the meantime please follow-up with your primary care doctor and also schedule an appointment in the [place] [facility] clinic. I think Jeevan is fine if you have recurrent pain.

## 2022-04-16 NOTE — ED PROVIDER NOTES
HPI   This is an incredibly healthy 17-year-old woman with a past medical history of dementia and diabetes who presents to the emergency department due to upper abdominal pain. Patient let her daughter in law know that she was having upper abdominal pain this morning. According to the daughter in law the patient was saying she was having pain under her rib cage bilaterally that went into the epigastric area. The patient describes it as sharp. The patient seemed to be in severe pain at some point so she was brought to the emergency department. Her pain has resolved and she is currently asymptomatic. Patient denies any other associated symptoms. She also pointed to her right flank to her daughter-in-law earlier today. It was reported that the patient said it hurt when she took deep breaths. No fevers or chills recently. Patient has had no cough or shortness of breath. No nausea or vomiting. Her daughter-in-law states the patient told her that she has had similar pain in the past, but never this severe. No past medical history on file. No past surgical history on file.       Family History:   Problem Relation Age of Onset    Dementia Other     Diabetes Other        Social History     Socioeconomic History    Marital status:      Spouse name: Not on file    Number of children: Not on file    Years of education: Not on file    Highest education level: Not on file   Occupational History    Not on file   Tobacco Use    Smoking status: Never Smoker    Smokeless tobacco: Never Used   Vaping Use    Vaping Use: Never used   Substance and Sexual Activity    Alcohol use: Not Currently    Drug use: Never    Sexual activity: Not on file   Other Topics Concern    Not on file   Social History Narrative    Not on file     Social Determinants of Health     Financial Resource Strain:     Difficulty of Paying Living Expenses: Not on file   Food Insecurity:     Worried About [facility] in the Last Year: Not on file    Ran Out of Food in the Last Year: Not on file   Transportation Needs:     Lack of Transportation (Medical): Not on file    Lack of Transportation (Non-Medical): Not on file   Physical Activity:     Days of Exercise per Week: Not on file    Minutes of Exercise per Session: Not on file   Stress:     Feeling of Stress : Not on file   Social Connections:     Frequency of Communication with Friends and Family: Not on file    Frequency of Social Gatherings with Friends and Family: Not on file    Attends Buddhist Services: Not on file    Active Member of [address] [place] [address] or Organizations: Not on file    Attends Club or Organization Meetings: Not on file    Marital Status: Not on file   Intimate Partner Violence:     Fear of Current or Ex-Partner: Not on file    Emotionally Abused: Not on file    Physically Abused: Not on file    Sexually Abused: Not on file   Housing Stability:     Unable to Pay for Housing in the Last Year: Not on file    Number of [place] in the Last Year: Not on file    Unstable Housing in the Last Year: Not on file         ALLERGIES: Penicillamine    Review of Systems   A complete review of systems was performed all systems reviewed are negative unless otherwise documented in the HPI. Vitals:    04/16/22 1259 04/16/22 1416   BP: (!) 170/77 (!) 169/76   Pulse: 66 69   Resp: 18 17   Temp: 96.9 °F (36.1 °C) 97.5 °F (36.4 °C)   SpO2: 96% 97%   Weight: 56.7 kg (125 lb)    Height: 5' 3\" (1.6 m)             Physical Exam  Constitutional:       Comments: Elderly woman who appears well and not acutely distressed   HENT:      Mouth/Throat:      Comments: Moist mucous membrane  Eyes:      General: No scleral icterus. Extraocular Movements: Extraocular movements intact. Neck:      Comments: Trachea midline. No JVD  Cardiovascular:      Comments: Regular rate and rhythm. No appreciable murmurs. 2+ radial pulses bilaterally.   Pulmonary:      Effort: Pulmonary effort is normal. No respiratory distress. Breath sounds: Normal breath sounds. No wheezing or rales. Abdominal:      Comments: Upper abdominal tenderness without rebound tenderness or guarding. No CVA tenderness bilaterally. No distention. Normal bowel sounds. Musculoskeletal:         General: No deformity. Normal range of motion. Skin:     General: Skin is warm and dry. Neurological:      Comments: Awake and alert. Speech is normal.  GCS is 15. [facility]  ED Course as of 04/16/22 1458   Sat Apr 16, 2022   1336 ED EKG interpretation:  Rhythm: normal sinus rhythm; and regular . Rate (approx.): 67; Axis: normal; ST/T wave: non-specific changes; No evidence of acute coronary ischemia. [AL]      ED Course User Index  [AL] Will Manning MD   5year-old female presents with the above chief complaint. Her vital signs are stable. She clinically looks quite well. She has no CVA tenderness. She does have some upper abdominal tenderness in the epigastric and bilateral upper quadrant regions. No distention. No lower abdominal tenderness. Her cardiopulmonary exam is unremarkable. Her work-up was started in triage. Her basic labs and urinalysis are reassuring. EKG reviewed and does not show any obvious evidence of acute ischemia. Her initial high-sensitivity troponin is 9 so this will be repeated. By the time I evaluated the patient she had gotten a CT scan with IV contrast that shows some bilateral hydronephrosis with no obvious obstructive lesions. She has diverticulosis without diverticulitis. She also has a small right pleural effusion. 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 I will think any of these apart from the hydronephrosis could account for her pain. I am going to asked nursing to have the patient urinate and bladder scanner make sure she is not retaining urine. Dedicated chest x-ray will be ordered.   Also going to order right upper quadrant ultrasound to assess for possible cholecystitis. As the patient is asymptomatic right now, PE seems less likely. Also noticed that when the patient was describing it she was twisting her trunk to try to reproduce her pain so could be musculoskeletal.  I be hesitant to give the patient another contrast load if she did have an elevated D-dimer and given my lower suspicion for this I am going to hold off pursuing a PE. Right upper quadrant ultrasound does not show any new findings compared to her CT. Repeat troponin is downtrending at 8. Chest x-ray unremarkable. Patient still not having any recurrent pain. She is deemed safe for discharge. Her family was instructed to have her follow-up with her primary care physician as well as return urology. Patient discharged in stable condition.       Procedures

## 2022-04-16 NOTE — ED NOTES

## 2022-04-17 LAB
ATRIAL RATE: 67 BPM
CALCULATED P AXIS, ECG09: 55 DEGREES
CALCULATED R AXIS, ECG10: 29 DEGREES
CALCULATED T AXIS, ECG11: 61 DEGREES
DIAGNOSIS, 93000: NORMAL
P-R INTERVAL, ECG05: 166 MS
Q-T INTERVAL, ECG07: 394 MS
QRS DURATION, ECG06: 84 MS
QTC CALCULATION (BEZET), ECG08: 416 MS
VENTRICULAR RATE, ECG03: 67 BPM

## 2022-04-25 ENCOUNTER — TELEPHONE (OUTPATIENT)
Dept: INTERNAL MEDICINE CLINIC | Age: 87
End: 2022-04-25

## 2022-04-25 NOTE — TELEPHONE ENCOUNTER
Call made to follow up and make an ER follow up appointment. If patient or family member calls back please assist in making an ER follow up. Nurse does not need to speck with patient.

## 2022-05-20 ENCOUNTER — OFFICE VISIT (OUTPATIENT)
Dept: INTERNAL MEDICINE CLINIC | Age: 87
End: 2022-05-20
Payer: MEDICARE

## 2022-05-20 VITALS
DIASTOLIC BLOOD PRESSURE: 78 MMHG | OXYGEN SATURATION: 98 % | TEMPERATURE: 98.2 F | HEART RATE: 73 BPM | WEIGHT: 125 LBS | RESPIRATION RATE: 13 BRPM | BODY MASS INDEX: 22.15 KG/M2 | SYSTOLIC BLOOD PRESSURE: 145 MMHG | HEIGHT: 63 IN

## 2022-05-20 DIAGNOSIS — R35.0 URINE FREQUENCY: ICD-10-CM

## 2022-05-20 DIAGNOSIS — G30.1 LATE ONSET ALZHEIMER'S DEMENTIA WITHOUT BEHAVIORAL DISTURBANCE (HCC): ICD-10-CM

## 2022-05-20 DIAGNOSIS — S61.212A LACERATION OF RIGHT MIDDLE FINGER WITHOUT FOREIGN BODY WITHOUT DAMAGE TO NAIL, INITIAL ENCOUNTER: ICD-10-CM

## 2022-05-20 DIAGNOSIS — Z11.1 SCREENING-PULMONARY TB: ICD-10-CM

## 2022-05-20 DIAGNOSIS — F02.80 LATE ONSET ALZHEIMER'S DEMENTIA WITHOUT BEHAVIORAL DISTURBANCE (HCC): ICD-10-CM

## 2022-05-20 DIAGNOSIS — L29.9 PRURITUS: ICD-10-CM

## 2022-05-20 DIAGNOSIS — R55 NEAR SYNCOPE: ICD-10-CM

## 2022-05-20 DIAGNOSIS — E21.3 HYPERPARATHYROIDISM (HCC): ICD-10-CM

## 2022-05-20 DIAGNOSIS — R06.09 DYSPNEA ON EXERTION: ICD-10-CM

## 2022-05-20 DIAGNOSIS — F41.9 ANXIETY: ICD-10-CM

## 2022-05-20 DIAGNOSIS — N13.39 OTHER HYDRONEPHROSIS: Primary | ICD-10-CM

## 2022-05-20 DIAGNOSIS — R01.1 MURMUR: ICD-10-CM

## 2022-05-20 LAB
BILIRUB UR QL STRIP: NEGATIVE
GLUCOSE UR-MCNC: NEGATIVE MG/DL
KETONES P FAST UR STRIP-MCNC: NEGATIVE MG/DL
PH UR STRIP: 6.5 [PH] (ref 4.6–8)
PROT UR QL STRIP: NEGATIVE
SP GR UR STRIP: 1.01 (ref 1–1.03)
UA UROBILINOGEN AMB POC: NORMAL (ref 0.2–1)
URINALYSIS CLARITY POC: CLEAR
URINALYSIS COLOR POC: YELLOW
URINE BLOOD POC: NEGATIVE
URINE LEUKOCYTES POC: NORMAL
URINE NITRITES POC: NEGATIVE

## 2022-05-20 PROCEDURE — 1090F PRES/ABSN URINE INCON ASSESS: CPT | Performed by: INTERNAL MEDICINE

## 2022-05-20 PROCEDURE — 81003 URINALYSIS AUTO W/O SCOPE: CPT | Performed by: INTERNAL MEDICINE

## 2022-05-20 PROCEDURE — G0463 HOSPITAL OUTPT CLINIC VISIT: HCPCS | Performed by: INTERNAL MEDICINE

## 2022-05-20 PROCEDURE — G8510 SCR DEP NEG, NO PLAN REQD: HCPCS | Performed by: INTERNAL MEDICINE

## 2022-05-20 PROCEDURE — G8420 CALC BMI NORM PARAMETERS: HCPCS | Performed by: INTERNAL MEDICINE

## 2022-05-20 PROCEDURE — 86580 TB INTRADERMAL TEST: CPT | Performed by: INTERNAL MEDICINE

## 2022-05-20 PROCEDURE — G8427 DOCREV CUR MEDS BY ELIG CLIN: HCPCS | Performed by: INTERNAL MEDICINE

## 2022-05-20 PROCEDURE — G8536 NO DOC ELDER MAL SCRN: HCPCS | Performed by: INTERNAL MEDICINE

## 2022-05-20 PROCEDURE — 99215 OFFICE O/P EST HI 40 MIN: CPT | Performed by: INTERNAL MEDICINE

## 2022-05-20 PROCEDURE — 1101F PT FALLS ASSESS-DOCD LE1/YR: CPT | Performed by: INTERNAL MEDICINE

## 2022-05-20 RX ORDER — SERTRALINE HYDROCHLORIDE 25 MG/1
25 TABLET, FILM COATED ORAL DAILY
Qty: 30 TABLET | Refills: 2 | Status: SHIPPED | OUTPATIENT
Start: 2022-05-20 | End: 2022-09-08

## 2022-05-20 NOTE — ASSESSMENT & PLAN NOTE
Still having some CAMPBELL  Echo normal  Lung exam normal  Likely related to deconditioning, possibly long covid

## 2022-05-20 NOTE — ASSESSMENT & PLAN NOTE
[facility]  Paperwork to be filled out  PPD done today - no TB symptoms (except for SOB, but I don't think this is related to SOB)  Need to clarify ambulatory question on paperwork  [employer] disability placard form provided

## 2022-05-20 NOTE — PROGRESS NOTES
Note   Chief Complaint   Forms, anxiety    Neto Boston is a 80 y.o. female     Accompanied by her daughter in law Yvon Huggins. Majority of history obtained from Yvon Ojedaland - pt extremely hard of hearing    1. Other hydronephrosis  Assessment & Plan:  ED 4/16/22 for RUQ abd pain that resolved on rpesentation  CT showing hydronephrosis  IMPRESSION  Mild bilateral hydronephrosis, with no visualized urinary tract calculi or  obstruction. Extensive sigmoid colon diverticulosis, without CT evidence of  acute diverticulitis. Bilateral dependent atelectasis and small right pleural  effusion. 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 saw [facility]  [address] down, right kidney enlarged   Nothing further to do at this time   Follow up with urology  2. Anxiety  Assessment & Plan:  Anxiety  Seems to be getting worse   \"afraid\" of everything, worried about everything   Has always been a little anxious prior to dementia diagnosis but has been getting worse   Daughter requesting something to help with anxiety  rec sertraline 25mg daily. Discussed this may be part of her disease process. Given info from alzheimer's association for redirecting tips  Orders:  -     sertraline (ZOLOFT) 25 mg tablet; Take 1 Tablet by mouth daily. Indications: anxiety, Normal, Disp-30 Tablet, R-2  3. Urine frequency  Assessment & Plan:  Mentioned by daughter. No other symptoms. UA negative  montior  Orders:  -     AMB POC URINALYSIS DIP STICK AUTO W/O MICRO  4. Late onset Alzheimer's dementia without behavioral disturbance [facility])  Assessment & Plan:  Planning to start [facility]  Paperwork to be filled out  PPD done today - no TB symptoms (except for SOB, but I don't think this is related to SOB)  Need to clarify ambulatory question on paperwork  [employer] disability placard form provided  5. Hyperparathyroidism ([address]  Assessment & Plan:   monitored by specialist. No acute findings meriting change in the plan  6.  Screening-pulmonary TB  -     AMB POC TUBERCULOSIS, INTRADERMAL (SKIN TEST)  7. Dyspnea on exertion  Assessment & Plan:  Still having some CAMPBELL  Echo normal  Lung exam normal  Likely related to deconditioning, possibly long covid   8. Pruritus  Assessment & Plan:  Left shoulder blade  Always itching  Has tried different locations, anti-itch creams  Nothing notable on exam.  Wonder if nerve related. rec trial lidocaine cream  9. Murmur  Assessment & Plan:  ech onormal  10. Near syncope  Assessment & Plan:  echo normal  11. Laceration of right middle finger without foreign body without damage to nail, initial encounter  Assessment & Plan:  Got her finger stuck in the car window, tear in skin  Sutures placed in the er  Was advised to follow up in the er for suture removal by the er. Does not appear infected at this time       Benefits, risks, possible drug interactions, and side effects of all new medications were reviewed with the patient. Pt verbalized understanding. Return to clinic:  As scheduled or ealier if needed    An electronic signature was used to authenticate this note. Freda Olivia MD  [facility]  5/20/2022    Future Appointments   Date Time Provider Johnny Zeng   0/05/6859 31:06 AM Xochilt Gusman MD [facility] BS AMB      On this date 05/20/2022 I have spent 45 minutes reviewing previous notes, test results and face to face with the patient discussing the diagnosis and importance of compliance with the treatment plan as well as documenting on the day of the visit. Objective   Vitals:       Visit Vitals  BP (!) 145/78 (BP 1 Location: Left upper arm, BP Patient Position: Sitting, BP Cuff Size: Adult)   Pulse 73   Temp 98.2 °F (36.8 °C) (Oral)   Resp 13   Ht 5' 3\" (1.6 m)   Wt 125 lb (56.7 kg)   SpO2 98%   BMI 22.14 kg/m²        Physical Exam  Constitutional:       Comments: Doesn't answer questions   Pulmonary:      Effort: No respiratory distress.    Neurological: Mental Status: She is alert. Current Outpatient Medications   Medication Sig    sertraline (ZOLOFT) 25 mg tablet Take 1 Tablet by mouth daily. Indications: anxiety    docusate sodium (COLACE) 100 mg capsule Take 100 mg by mouth daily.  cholecalciferol, vitamin D3, 50 mcg (2,000 unit) tab Take  by mouth daily.  vit A/vit C/vit E/zinc/copper (PRESERVISION AREDS PO) Take  by mouth.  B.infantis-B.ani-B.long-B.bifi 10-15 mg TbEC Take  by mouth.  ascorbic acid, vitamin C, (VITAMIN C) 250 mg tablet Take  by mouth.  FIBER CHOICE PO Take  by mouth every evening.  alendronate (FOSAMAX) 70 mg tablet Take  by mouth. (Patient not taking: Reported on 5/20/2022)    aspirin delayed-release 81 mg tablet Take  by mouth daily. (Patient not taking: Reported on 5/20/2022)     No current facility-administered medications for this visit.

## 2022-05-21 NOTE — ASSESSMENT & PLAN NOTE
ED 4/16/22 for RUQ abd pain that resolved on rpesentation  CT showing hydronephrosis  IMPRESSION  Mild bilateral hydronephrosis, with no visualized urinary tract calculi or  obstruction. Extensive sigmoid colon diverticulosis, without CT evidence of  acute diverticulitis. Bilateral dependent atelectasis and small right pleural  effusion. 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     saw [facility]  [address] down, right kidney enlarged   Nothing further to do at this time   Follow up with urology

## 2022-05-21 NOTE — ASSESSMENT & PLAN NOTE
Got her finger stuck in the car window, tear in skin  Sutures placed in the er  Was advised to follow up in the er for suture removal by the er.   Does not appear infected at this time

## 2022-05-21 NOTE — ASSESSMENT & PLAN NOTE
Left shoulder blade  Always itching  Has tried different locations, anti-itch creams  Nothing notable on exam.  Wonder if nerve related.  rec trial lidocaine cream

## 2022-05-21 NOTE — ASSESSMENT & PLAN NOTE
Anxiety  Seems to be getting worse   \"afraid\" of everything, worried about everything   Has always been a little anxious prior to dementia diagnosis but has been getting worse   Daughter requesting something to help with anxiety  rec sertraline 25mg daily. Discussed this may be part of her disease process.  Given info from alzheimer's association for redirecting tips

## 2022-05-23 ENCOUNTER — TELEPHONE (OUTPATIENT)
Dept: INTERNAL MEDICINE CLINIC | Age: 87
End: 2022-05-23

## 2022-05-23 LAB
MM INDURATION POC: 0 MM (ref 0–5)
PPD POC: NEGATIVE NEGATIVE

## 2022-05-23 NOTE — PROGRESS NOTES
PPD Reading Note  PPD read and results entered in [address]. Result:0 mm induration.   Interpretation: Negative  If test not read within 48-72 hours of initial placement, patient advised to repeat in other arm 1-3 weeks after this test.  Allergic reaction: No  Rohini Davalos LPN

## 2022-05-23 NOTE — TELEPHONE ENCOUNTER
Spoke with director of admission for adult day care. Noted on form per Director can ambulate with verbal prompts for emergency evacuation acceptable. Daughter came to office and  would like DNR form completed for patient.

## 2022-09-08 ENCOUNTER — OFFICE VISIT (OUTPATIENT)
Dept: INTERNAL MEDICINE CLINIC | Age: 87
End: 2022-09-08
Payer: MEDICARE

## 2022-09-08 VITALS
BODY MASS INDEX: 22.5 KG/M2 | WEIGHT: 127 LBS | TEMPERATURE: 97.5 F | OXYGEN SATURATION: 96 % | RESPIRATION RATE: 12 BRPM | SYSTOLIC BLOOD PRESSURE: 127 MMHG | HEART RATE: 77 BPM | HEIGHT: 63 IN | DIASTOLIC BLOOD PRESSURE: 77 MMHG

## 2022-09-08 DIAGNOSIS — N30.00 ACUTE CYSTITIS WITHOUT HEMATURIA: ICD-10-CM

## 2022-09-08 DIAGNOSIS — E21.3 HYPERPARATHYROIDISM (HCC): ICD-10-CM

## 2022-09-08 DIAGNOSIS — Z00.00 MEDICARE ANNUAL WELLNESS VISIT, SUBSEQUENT: Primary | ICD-10-CM

## 2022-09-08 DIAGNOSIS — F41.9 ANXIETY: ICD-10-CM

## 2022-09-08 DIAGNOSIS — L82.1 SEBORRHEIC KERATOSIS: ICD-10-CM

## 2022-09-08 LAB
BILIRUB UR QL STRIP: NEGATIVE
GLUCOSE UR-MCNC: NEGATIVE MG/DL
KETONES P FAST UR STRIP-MCNC: NEGATIVE MG/DL
PH UR STRIP: 5.5 [PH] (ref 4.6–8)
PROT UR QL STRIP: NEGATIVE
SP GR UR STRIP: 1.02 (ref 1–1.03)
UA UROBILINOGEN AMB POC: NORMAL (ref 0.2–1)
URINALYSIS CLARITY POC: CLEAR
URINALYSIS COLOR POC: YELLOW
URINE BLOOD POC: NORMAL
URINE LEUKOCYTES POC: NORMAL
URINE NITRITES POC: NEGATIVE

## 2022-09-08 PROCEDURE — G8536 NO DOC ELDER MAL SCRN: HCPCS | Performed by: INTERNAL MEDICINE

## 2022-09-08 PROCEDURE — G8510 SCR DEP NEG, NO PLAN REQD: HCPCS | Performed by: INTERNAL MEDICINE

## 2022-09-08 PROCEDURE — G0439 PPPS, SUBSEQ VISIT: HCPCS | Performed by: INTERNAL MEDICINE

## 2022-09-08 PROCEDURE — 99214 OFFICE O/P EST MOD 30 MIN: CPT | Performed by: INTERNAL MEDICINE

## 2022-09-08 PROCEDURE — G8427 DOCREV CUR MEDS BY ELIG CLIN: HCPCS | Performed by: INTERNAL MEDICINE

## 2022-09-08 PROCEDURE — G8420 CALC BMI NORM PARAMETERS: HCPCS | Performed by: INTERNAL MEDICINE

## 2022-09-08 PROCEDURE — 1090F PRES/ABSN URINE INCON ASSESS: CPT | Performed by: INTERNAL MEDICINE

## 2022-09-08 PROCEDURE — 1101F PT FALLS ASSESS-DOCD LE1/YR: CPT | Performed by: INTERNAL MEDICINE

## 2022-09-08 PROCEDURE — 81002 URINALYSIS NONAUTO W/O SCOPE: CPT | Performed by: INTERNAL MEDICINE

## 2022-09-08 PROCEDURE — G0463 HOSPITAL OUTPT CLINIC VISIT: HCPCS | Performed by: INTERNAL MEDICINE

## 2022-09-08 RX ORDER — NITROFURANTOIN 25; 75 MG/1; MG/1
100 CAPSULE ORAL 2 TIMES DAILY
Qty: 10 CAPSULE | Refills: 0 | Status: SHIPPED | OUTPATIENT
Start: 2022-09-08 | End: 2022-09-13

## 2022-09-08 RX ORDER — DENOSUMAB 60 MG/ML
60 INJECTION SUBCUTANEOUS
COMMUNITY

## 2022-09-08 NOTE — PROGRESS NOTES
Identified pt with two pt identifiers(name and ). Reviewed record in preparation for visit and have obtained necessary documentation. Chief Complaint   Patient presents with    Annual Wellness Visit        Vitals:    22 1036   BP: 127/77   Pulse: 77   Resp: 12   Temp: 97.5 °F (36.4 °C)   TempSrc: Temporal   SpO2: 96%   Weight: 127 lb (57.6 kg)   Height: 5' 3\" (1.6 m)   PainSc:   0 - No pain       Health Maintenance Due   Topic    DTaP/Tdap/Td series (1 - Tdap)    Bone Densitometry (Dexa) Screening     Medicare Yearly Exam     Pneumococcal 65+ years (2 - PCV)    COVID-19 Vaccine (4 - Booster for Moderna series)    Flu Vaccine (1)       Coordination of Care Questionnaire:  :   1) Have you been to an emergency room, urgent care, or hospitalized since your last visit? If yes, where when, and reason for visit? Yes seen in urgent care for injury to her finger in . 2. Have seen or consulted any other health care provider since your last visit? If yes, where when, and reason for visit? No      Patient is accompanied by son and daughter in law I have received verbal consent from Kendrick Villatoro to discuss any/all medical information while they are present in the room.

## 2022-09-08 NOTE — ASSESSMENT & PLAN NOTE
Family mentioned a skin lesion on her right shoulder. Appearance consistent with seborrheic keratosis.   Benign, nothing needs to be done

## 2022-09-08 NOTE — PROGRESS NOTES
Assessment and Plan     Accompanied by son and daughter-in-law who provide the history. Patient is extremely hard of hearing  May need paperwork done later this fall for new living place    1. Medicare annual wellness visit, subsequent  Assessment & Plan:  Discussed pneumonia vaccine  DEXA with endocrinologist  Discussed flu vaccine, COVID booster  Does have an advanced directive  Orders:  -     OTHER; PCV20, Print, Disp-1 Units, R-0  2. Hyperparathyroidism ([address]  Assessment & Plan:   monitored by specialist. No acute findings meriting change in the plan  3. Acute cystitis without hematuria  Assessment & Plan:  Increased urinary frequency in the past few days  Increased urine odor  Burning - seems to happen after using rough toilet paper  Using vulvar cream for atrophic vaginitis as recommended by urology  UA with trace leuks. Given symptoms, treat as UTI. Sent for culture. Macrobid sent to pharmacy. Orders:  -     nitrofurantoin, macrocrystal-monohydrate, (MACROBID) 100 mg capsule; Take 1 Capsule by mouth two (2) times a day for 5 days. , Normal, Disp-10 Capsule, R-0  -     AMB POC URINALYSIS DIP STICK MANUAL W/O MICRO  -     CULTURE, URINE; Future  4. Anxiety  Assessment & Plan:  Was given sertraline last visit. Developed diarrhea so they stopped the medication  Family prefers to hold off on further medications at this time. Monitor  5. Seborrheic keratosis  Assessment & Plan:  Family mentioned a skin lesion on her right shoulder. Appearance consistent with seborrheic keratosis. Benign, nothing needs to be done     Benefits, risks, possible drug interactions, and side effects of all new medications were reviewed with the patient. Pt verbalized understanding. Return to clinic: Earliest convenience to have her ears irrigated, otherwise 6 months  hard of hearing  lives with daughter in law and son    An electronic signature was used to authenticate this note.   Curtis Varela MD  Internal Medicine [facility]  9/8/2022    No future appointments. History of Present Illness   Chief Complaint   Medicare wellness    Janiya Brown is a 80 y.o. female     Physical Exam   Vitals:       Visit Vitals  /77 (BP 1 Location: Right upper arm, BP Patient Position: Sitting, BP Cuff Size: Adult)   Pulse 77   Temp 97.5 °F (36.4 °C) (Temporal)   Resp 12   Ht 5' 3\" (1.6 m)   Wt 127 lb (57.6 kg)   SpO2 96%   BMI 22.50 kg/m²        Physical Exam  Constitutional:       General: She is not in acute distress. Appearance: She is well-developed. HENT:      Ears:      Comments: Impacted bilaterally  Eyes:      Extraocular Movements: Extraocular movements intact. Conjunctiva/sclera: Conjunctivae normal.   Cardiovascular:      Rate and Rhythm: Normal rate and regular rhythm. Pulses: Normal pulses. Heart sounds: No murmur heard. No friction rub. No gallop. Pulmonary:      Effort: No respiratory distress. Breath sounds: No wheezing, rhonchi or rales. Abdominal:      General: Bowel sounds are normal. There is no distension. Palpations: Abdomen is soft. There is no hepatomegaly, splenomegaly or mass. Tenderness: There is no abdominal tenderness. There is no guarding. Musculoskeletal:      Cervical back: Neck supple. Neurological:      Mental Status: She is alert. This is the Subsequent Medicare Annual Wellness Exam, performed 12 months or more after the Initial AWV or the last Subsequent AWV    I have reviewed the patient's medical history in detail and updated the computerized patient record. Assessment/Plan   Education and counseling provided:  Are appropriate based on today's review and evaluation    1. Medicare annual wellness visit, subsequent  Assessment & Plan:  Discussed pneumonia vaccine  DEXA with endocrinologist  Discussed flu vaccine, COVID booster  Does have an advanced directive  Orders:  -     OTHER; PCV20, Print, Disp-1 Units, R-0  2. Hyperparathyroidism ([address]  Assessment & Plan:   monitored by specialist. No acute findings meriting change in the plan  3. Acute cystitis without hematuria  Assessment & Plan:  Increased urinary frequency in the past few days  Increased urine odor  Burning - seems to happen after using rough toilet paper  Using vulvar cream for atrophic vaginitis as recommended by urology  UA with trace leuks. Given symptoms, treat as UTI. Sent for culture. Macrobid sent to pharmacy. Orders:  -     nitrofurantoin, macrocrystal-monohydrate, (MACROBID) 100 mg capsule; Take 1 Capsule by mouth two (2) times a day for 5 days. , Normal, Disp-10 Capsule, R-0  -     AMB POC URINALYSIS DIP STICK MANUAL W/O MICRO  -     CULTURE, URINE; Future  4. Anxiety  Assessment & Plan:  Was given sertraline last visit. Developed diarrhea so they stopped the medication  Family prefers to hold off on further medications at this time. Monitor  5. Seborrheic keratosis  Assessment & Plan:  Family mentioned a skin lesion on her right shoulder. Appearance consistent with seborrheic keratosis. Benign, nothing needs to be done     Depression Risk Factor Screening     3 most recent PHQ Screens 9/8/2022   Little interest or pleasure in doing things Not at all   Feeling down, depressed, irritable, or hopeless Not at all   Total Score PHQ 2 0       Alcohol & Drug Abuse Risk Screen    Do you average more than 1 drink per night or more than 7 drinks a week:  No    On any one occasion in the past three months have you have had more than 3 drinks containing alcohol:  No          Functional Ability and Level of Safety    Hearing:  Decreased      Activities of Daily Living:   The home contains: handrails, grab bars, and bedside commode, shower chair  Patient needs help with:  phone, transportation, shopping, preparing meals, laundry, housework, managing medications, managing money, eating, dressing, bathing, hygiene, bathroom needs, and walking -can participate but needs prompting      Ambulation: with mild difficulty and uses rollator     Fall Risk:  Fall Risk Assessment, last 12 mths 9/8/2022   Able to walk? Yes   Fall in past 12 months? 0   Do you feel unsteady? -   Are you worried about falling -   Is TUG test greater than 12 seconds? -   Is the gait abnormal? -   Number of falls in past 12 months -   Fall with injury? -      Abuse Screen:  Patient is not abused       Cognitive Screening    Has your family/caregiver stated any concerns about your memory: yes - known dementia         Health Maintenance Due     Health Maintenance Due   Topic Date Due    Bone Densitometry (Dexa) Screening  Never done    Pneumococcal 65+ years (2 - PCV) 01/01/2022    COVID-19 Vaccine (4 - Booster for Jackquline Page series) 07/09/2022    Flu Vaccine (1) 09/01/2022       Patient Care Team   Patient Care Team:  Nicko Bateman MD as PCP - General (Internal Medicine Physician)  Nicko Bateman MD as PCP - [facility] Empaneled Provider    History     Patient Active Problem List   Diagnosis Code    Murmur R01.1    COVID-19 U07.1    Late onset Alzheimer's dementia without behavioral disturbance (HCC) G30.1, F02.80    Macular degeneration H35.30    Vaginal pain R10.2    Hyperparathyroidism ([address] E21.3    Osteoporosis M81.0    History of onychomycosis Z86.19    Urinary incontinence R32    Dyspnea on exertion R06.00    Near syncope R55    Other hydronephrosis N13.39    Anxiety F41.9    Pruritus L29.9    Urine frequency R35.0    Laceration of right middle finger without foreign body without damage to nail S61.212A    Acute cystitis without hematuria N30.00    Medicare annual wellness visit, subsequent Z00.00    Seborrheic keratosis L82.1     History reviewed. No pertinent past medical history. History reviewed. No pertinent surgical history. Current Outpatient Medications   Medication Sig Dispense Refill    denosumab (Prolia) 60 mg/mL injection 60 mg by SubCUTAneous route.  Every 6 months      OTHER PCV20 1 Units 0    nitrofurantoin, macrocrystal-monohydrate, (MACROBID) 100 mg capsule Take 1 Capsule by mouth two (2) times a day for 5 days. 10 Capsule 0    cholecalciferol, vitamin D3, 50 mcg (2,000 unit) tab Take  by mouth daily. vit A/vit C/vit E/zinc/copper (PRESERVISION AREDS PO) Take  by mouth. B.infantis-B.ani-B.long-B.bifi 10-15 mg TbEC Take  by mouth. FIBER CHOICE PO Take  by mouth every evening.        Allergies   Allergen Reactions    Penicillamine Other (comments)       Family History   Problem Relation Age of Onset    Dementia Other     Diabetes Other      Social History     Tobacco Use    Smoking status: Never    Smokeless tobacco: Never   Substance Use Topics    Alcohol use: Not Currently         Martínez Lopez MD

## 2022-09-08 NOTE — ASSESSMENT & PLAN NOTE
Discussed pneumonia vaccine  DEXA with endocrinologist  Discussed flu vaccine, COVID booster  Does have an advanced directive

## 2022-09-08 NOTE — ASSESSMENT & PLAN NOTE
Was given sertraline last visit. Developed diarrhea so they stopped the medication  Family prefers to hold off on further medications at this time.   Monitor

## 2022-09-08 NOTE — PATIENT INSTRUCTIONS

## 2022-09-08 NOTE — ASSESSMENT & PLAN NOTE
Increased urinary frequency in the past few days  Increased urine odor  Burning - seems to happen after using rough toilet paper  Using vulvar cream for atrophic vaginitis as recommended by urology  UA with trace leuks. Given symptoms, treat as UTI. Sent for culture. Macrobid sent to pharmacy.

## 2022-09-11 LAB
BACTERIA SPEC CULT: ABNORMAL
CC UR VC: ABNORMAL
SERVICE CMNT-IMP: ABNORMAL

## 2022-09-12 ENCOUNTER — TELEPHONE (OUTPATIENT)
Dept: INTERNAL MEDICINE CLINIC | Age: 87
End: 2022-09-12

## 2022-09-12 NOTE — PROGRESS NOTES
Please call the pt - urine culture did come back positive. Was given macrobid during her visit. Are her symptoms getting better?

## 2022-09-12 NOTE — TELEPHONE ENCOUNTER
----- Message from Diaz Seymour MD sent at 1/93/4143  7:30 AM EDT -----  Please call the pt - urine culture did come back positive. Was given macrobid during her visit. Are her symptoms getting better?

## 2022-09-12 NOTE — TELEPHONE ENCOUNTER
Nurse called and spoke to daughter( CG) yeimy HIPAA verified. Stated that patient is doing better and is not having any sx of burning  during urination or frequency. Advised to call us back if needed.

## 2022-09-27 ENCOUNTER — TELEPHONE (OUTPATIENT)
Dept: INTERNAL MEDICINE CLINIC | Age: 87
End: 2022-09-27

## 2022-09-27 NOTE — TELEPHONE ENCOUNTER
Patient's daughter in law called to state the patient has been experiencing what is high bp for her. She also has been dizzy lately as well. She reports the patient's blood pressure was 149/94 at 9:30 am and 130/68 later that day. Her heart rate was at 67 bpm. Patient has an upcoming appointment for Thursday for ear wax removal. Patient's daughter in law just wanted the nurse to have this information prior to the appointment.

## 2022-09-27 NOTE — TELEPHONE ENCOUNTER
Nurse returned call to see what BP readings are no answer. Please see note below for upcoming visit.

## 2022-09-29 ENCOUNTER — OFFICE VISIT (OUTPATIENT)
Dept: INTERNAL MEDICINE CLINIC | Age: 87
End: 2022-09-29
Payer: MEDICARE

## 2022-09-29 VITALS
HEIGHT: 63 IN | BODY MASS INDEX: 22.43 KG/M2 | DIASTOLIC BLOOD PRESSURE: 70 MMHG | SYSTOLIC BLOOD PRESSURE: 147 MMHG | HEART RATE: 75 BPM | WEIGHT: 126.6 LBS | RESPIRATION RATE: 14 BRPM | OXYGEN SATURATION: 96 %

## 2022-09-29 DIAGNOSIS — G30.1 LATE ONSET ALZHEIMER'S DEMENTIA WITHOUT BEHAVIORAL DISTURBANCE (HCC): ICD-10-CM

## 2022-09-29 DIAGNOSIS — H35.30 MACULAR DEGENERATION, UNSPECIFIED LATERALITY, UNSPECIFIED TYPE: ICD-10-CM

## 2022-09-29 DIAGNOSIS — H61.23 BILATERAL IMPACTED CERUMEN: ICD-10-CM

## 2022-09-29 DIAGNOSIS — F02.80 LATE ONSET ALZHEIMER'S DEMENTIA WITHOUT BEHAVIORAL DISTURBANCE (HCC): ICD-10-CM

## 2022-09-29 DIAGNOSIS — R42 DIZZINESS: ICD-10-CM

## 2022-09-29 PROCEDURE — G8427 DOCREV CUR MEDS BY ELIG CLIN: HCPCS | Performed by: INTERNAL MEDICINE

## 2022-09-29 PROCEDURE — G8420 CALC BMI NORM PARAMETERS: HCPCS | Performed by: INTERNAL MEDICINE

## 2022-09-29 PROCEDURE — G8536 NO DOC ELDER MAL SCRN: HCPCS | Performed by: INTERNAL MEDICINE

## 2022-09-29 PROCEDURE — G0463 HOSPITAL OUTPT CLINIC VISIT: HCPCS | Performed by: INTERNAL MEDICINE

## 2022-09-29 PROCEDURE — 1090F PRES/ABSN URINE INCON ASSESS: CPT | Performed by: INTERNAL MEDICINE

## 2022-09-29 PROCEDURE — 99214 OFFICE O/P EST MOD 30 MIN: CPT | Performed by: INTERNAL MEDICINE

## 2022-09-29 PROCEDURE — G8510 SCR DEP NEG, NO PLAN REQD: HCPCS | Performed by: INTERNAL MEDICINE

## 2022-09-29 PROCEDURE — 1101F PT FALLS ASSESS-DOCD LE1/YR: CPT | Performed by: INTERNAL MEDICINE

## 2022-09-29 RX ORDER — BISMUTH SUBSALICYLATE 262 MG
1 TABLET,CHEWABLE ORAL DAILY
COMMUNITY

## 2022-09-29 NOTE — ASSESSMENT & PLAN NOTE
Last week was compalining of dizziness and weakness  After having a large bowel movement  Checked her bp 149/94, which is higher than usual, checked again 130/68, HR in 60s, got better throughout the day  Mentioned it a little bit the next day but was okay the rest of the day, no issues since  4 large glasses a day   Not sure how muchshe eats/drinks on days she goes to [address] [place] [address]  Asymptomatic at this time, rec monitoring.  Ensure hydration and nutrition

## 2022-09-29 NOTE — ASSESSMENT & PLAN NOTE
Snellen eye test done for [facility] paperwork and pt reports being unable to see well out of right eye - didn't really feel like she could see me  Advised to discuss this with her ophthalmologist

## 2022-09-29 NOTE — PROGRESS NOTES
Note   Chief Complaint   Ear irrigation    Jaskaran Herrera is a 80 y.o. female     Accompanied by son and daughter-in-law who provide the history. Patient is extremely hard of hearing  May need paperwork done later this fall for new living place    1. Bilateral impacted cerumen  Assessment & Plan:   Irrigated today  2. Late onset Alzheimer's dementia without behavioral disturbance [facility])  Assessment & Plan:   Needed paperwork filled out today for the [facility]   Needs help with pulling up pants and cleaning after bathroom   Uses rollator for longer distances, holds onto son for shorter distances   Mostly sits in a chair all day, walks around the house  MWF goes to [employer] [facility] (where she mostly sits in a chair)  Leaves house 3 times a week or when her children have to leave, she comes with them  3. Dizziness  Assessment & Plan:  Last week was compalining of dizziness and weakness  After having a large bowel movement  Checked her bp 149/94, which is higher than usual, checked again 130/68, HR in 60s, got better throughout the day  Mentioned it a little bit the next day but was okay the rest of the day, no issues since  4 large glasses a day   Not sure how muchshe eats/drinks on days she goes to [address] [place] [address]  Asymptomatic at this time, rec monitoring. Ensure hydration and nutrition  4. Macular degeneration, unspecified laterality, unspecified type  Assessment & Plan:  Snellen eye test done for [facility] paperwork and pt reports being unable to see well out of right eye - didn't really feel like she could see me  Advised to discuss this with her ophthalmologist      Benefits, risks, possible drug interactions, and side effects of all new medications were reviewed with the patient. Pt verbalized understanding. Return to clinic:  6 months general follow up   hard of hearing  lives with daughter in law and son    An electronic signature was used to authenticate this note.   Tyler Cabral MD  [facility]  9/29/2022    No future appointments. On this date 09/29/2022 I have spent 36 minutes reviewing previous notes, test results and face to face with the patient discussing the diagnosis and importance of compliance with the treatment plan as well as documenting on the day of the visit. Objective   Vitals:       Visit Vitals  BP (!) 147/70 (BP 1 Location: Right upper arm, BP Patient Position: Sitting, BP Cuff Size: Small adult)   Pulse 75   Resp 14   Ht 5' 3\" (1.6 m)   Wt 126 lb 9.6 oz (57.4 kg)   SpO2 96%   BMI 22.43 kg/m²        Physical Exam  Constitutional:       Appearance: Normal appearance. She is not ill-appearing. Cardiovascular:      Rate and Rhythm: Normal rate and regular rhythm. Heart sounds: No murmur heard. No friction rub. No gallop. Pulmonary:      Effort: No respiratory distress. Breath sounds: Normal breath sounds. No wheezing, rhonchi or rales. Neurological:      Mental Status: She is alert. Current Outpatient Medications   Medication Sig    multivitamin (ONE A DAY) tablet Take 1 Tablet by mouth daily. denosumab (Prolia) 60 mg/mL injection 60 mg by SubCUTAneous route. Every 6 months    OTHER PCV20    cholecalciferol, vitamin D3, 50 mcg (2,000 unit) tab Take  by mouth daily. vit A/vit C/vit E/zinc/copper (PRESERVISION AREDS PO) Take  by mouth. B.infantis-B.ani-B.long-B.bifi 10-15 mg TbEC Take  by mouth. FIBER CHOICE PO Take  by mouth every evening. No current facility-administered medications for this visit.

## 2022-09-29 NOTE — ASSESSMENT & PLAN NOTE
Needed paperwork filled out today for the [facility]   Needs help with pulling up pants and cleaning after bathroom   Uses rollator for longer distances, holds onto son for shorter distances   Mostly sits in a chair all day, walks around the house  MWF goes to [employer] [facility] (where she mostly sits in a chair)  Leaves house 3 times a week or when her children have to leave, she comes with them

## 2022-10-07 ENCOUNTER — TELEPHONE (OUTPATIENT)
Dept: INTERNAL MEDICINE CLINIC | Age: 87
End: 2022-10-07

## 2022-10-07 NOTE — TELEPHONE ENCOUNTER
Ear drained yellow fluid for 3 days after being flushed. Patient can no longer hear out of that ear.
Nurse returned call to daughter. Daughter stated that patient left ear was draining yellow fluid for three days out of left ear once flushed in office. Patient left ear is now full and can not hear out of left ear. Patient does not c/o any pain in the left ear. Nurse offered visit for today or Monday for evaluation of ear daughter stated that patient is at adult  and Tuesday works better. Nurse advised to monitor ear and call back Monday to schedule for Tuesday at a good time for patient. Advised if patient experience pain over the weekend to go to the ER. Patient daughter was thankful.
Patient's daughter in law (HIPAA verified) called to state the patient had her ear flushed in our office last week. She states the patient's ear drained yellow fluid for 3 days after being flushed. She states the patient states they can no longer hear out of that ear. Please call back and advise.
fair balance

## 2022-10-08 PROBLEM — Z00.00 MEDICARE ANNUAL WELLNESS VISIT, SUBSEQUENT: Status: RESOLVED | Noted: 2022-09-08 | Resolved: 2022-10-08

## 2022-12-04 ENCOUNTER — APPOINTMENT (OUTPATIENT)
Dept: CT IMAGING | Age: 87
End: 2022-12-04
Attending: EMERGENCY MEDICINE
Payer: MEDICARE

## 2022-12-04 ENCOUNTER — APPOINTMENT (OUTPATIENT)
Dept: MRI IMAGING | Age: 87
End: 2022-12-04
Attending: EMERGENCY MEDICINE
Payer: MEDICARE

## 2022-12-04 ENCOUNTER — HOSPITAL ENCOUNTER (OUTPATIENT)
Age: 87
Setting detail: OBSERVATION
Discharge: HOME HEALTH | End: 2022-12-05
Attending: EMERGENCY MEDICINE | Admitting: INTERNAL MEDICINE
Payer: MEDICARE

## 2022-12-04 ENCOUNTER — APPOINTMENT (OUTPATIENT)
Dept: GENERAL RADIOLOGY | Age: 87
End: 2022-12-04
Attending: EMERGENCY MEDICINE
Payer: MEDICARE

## 2022-12-04 DIAGNOSIS — G45.9 TIA (TRANSIENT ISCHEMIC ATTACK): ICD-10-CM

## 2022-12-04 DIAGNOSIS — G30.1 LATE ONSET ALZHEIMER'S DEMENTIA WITHOUT BEHAVIORAL DISTURBANCE (HCC): ICD-10-CM

## 2022-12-04 DIAGNOSIS — E78.2 MIXED HYPERLIPIDEMIA: ICD-10-CM

## 2022-12-04 DIAGNOSIS — F02.80 LATE ONSET ALZHEIMER'S DEMENTIA WITHOUT BEHAVIORAL DISTURBANCE (HCC): ICD-10-CM

## 2022-12-04 DIAGNOSIS — R29.90 STROKE-LIKE SYMPTOMS: Primary | ICD-10-CM

## 2022-12-04 PROBLEM — R47.9 SPEECH DISTURBANCE: Status: ACTIVE | Noted: 2022-12-04

## 2022-12-04 LAB
ALBUMIN SERPL-MCNC: 3.9 G/DL (ref 3.5–5)
ALBUMIN/GLOB SERPL: 1.1 {RATIO} (ref 1.1–2.2)
ALP SERPL-CCNC: 61 U/L (ref 45–117)
ALT SERPL-CCNC: 33 U/L (ref 12–78)
ANION GAP SERPL CALC-SCNC: 3 MMOL/L (ref 5–15)
APPEARANCE UR: CLEAR
AST SERPL-CCNC: 27 U/L (ref 15–37)
ATRIAL RATE: 69 BPM
BACTERIA URNS QL MICRO: NEGATIVE /HPF
BASOPHILS # BLD: 0.1 K/UL (ref 0–0.1)
BASOPHILS NFR BLD: 1 % (ref 0–1)
BILIRUB SERPL-MCNC: 0.5 MG/DL (ref 0.2–1)
BILIRUB UR QL: NEGATIVE
BUN SERPL-MCNC: 13 MG/DL (ref 6–20)
BUN/CREAT SERPL: 16 (ref 12–20)
CALCIUM SERPL-MCNC: 10.8 MG/DL (ref 8.5–10.1)
CALCULATED P AXIS, ECG09: 45 DEGREES
CALCULATED R AXIS, ECG10: 32 DEGREES
CALCULATED T AXIS, ECG11: 41 DEGREES
CHLORIDE SERPL-SCNC: 108 MMOL/L (ref 97–108)
CHOLEST SERPL-MCNC: 254 MG/DL
CO2 SERPL-SCNC: 29 MMOL/L (ref 21–32)
COLOR UR: ABNORMAL
COMMENT, HOLDF: NORMAL
CREAT SERPL-MCNC: 0.83 MG/DL (ref 0.55–1.02)
DIAGNOSIS, 93000: NORMAL
DIFFERENTIAL METHOD BLD: NORMAL
EOSINOPHIL # BLD: 0.2 K/UL (ref 0–0.4)
EOSINOPHIL NFR BLD: 5 % (ref 0–7)
EPITH CASTS URNS QL MICRO: ABNORMAL /LPF
ERYTHROCYTE [DISTWIDTH] IN BLOOD BY AUTOMATED COUNT: 13.2 % (ref 11.5–14.5)
EST. AVERAGE GLUCOSE BLD GHB EST-MCNC: 108 MG/DL
GLOBULIN SER CALC-MCNC: 3.7 G/DL (ref 2–4)
GLUCOSE BLD STRIP.AUTO-MCNC: 114 MG/DL (ref 65–117)
GLUCOSE SERPL-MCNC: 106 MG/DL (ref 65–100)
GLUCOSE UR STRIP.AUTO-MCNC: NEGATIVE MG/DL
HBA1C MFR BLD: 5.4 % (ref 4–5.6)
HCT VFR BLD AUTO: 37.9 % (ref 35–47)
HDLC SERPL-MCNC: 88 MG/DL
HDLC SERPL: 2.9 {RATIO} (ref 0–5)
HGB BLD-MCNC: 12.3 G/DL (ref 11.5–16)
HGB UR QL STRIP: ABNORMAL
IMM GRANULOCYTES # BLD AUTO: 0 K/UL (ref 0–0.04)
IMM GRANULOCYTES NFR BLD AUTO: 0 % (ref 0–0.5)
INR PPP: 1 (ref 0.9–1.1)
KETONES UR QL STRIP.AUTO: NEGATIVE MG/DL
LDLC SERPL CALC-MCNC: 149.2 MG/DL (ref 0–100)
LEUKOCYTE ESTERASE UR QL STRIP.AUTO: NEGATIVE
LYMPHOCYTES # BLD: 1.2 K/UL (ref 0.8–3.5)
LYMPHOCYTES NFR BLD: 28 % (ref 12–49)
MCH RBC QN AUTO: 30.5 PG (ref 26–34)
MCHC RBC AUTO-ENTMCNC: 32.5 G/DL (ref 30–36.5)
MCV RBC AUTO: 94 FL (ref 80–99)
MONOCYTES # BLD: 0.4 K/UL (ref 0–1)
MONOCYTES NFR BLD: 8 % (ref 5–13)
NEUTS SEG # BLD: 2.6 K/UL (ref 1.8–8)
NEUTS SEG NFR BLD: 58 % (ref 32–75)
NITRITE UR QL STRIP.AUTO: NEGATIVE
NRBC # BLD: 0 K/UL (ref 0–0.01)
NRBC BLD-RTO: 0 PER 100 WBC
P-R INTERVAL, ECG05: 168 MS
PH UR STRIP: 7 [PH] (ref 5–8)
PLATELET # BLD AUTO: 240 K/UL (ref 150–400)
PMV BLD AUTO: 10.4 FL (ref 8.9–12.9)
POTASSIUM SERPL-SCNC: 4.3 MMOL/L (ref 3.5–5.1)
PROT SERPL-MCNC: 7.6 G/DL (ref 6.4–8.2)
PROT UR STRIP-MCNC: NEGATIVE MG/DL
PROTHROMBIN TIME: 10.3 SEC (ref 9–11.1)
Q-T INTERVAL, ECG07: 372 MS
QRS DURATION, ECG06: 80 MS
QTC CALCULATION (BEZET), ECG08: 398 MS
RBC # BLD AUTO: 4.03 M/UL (ref 3.8–5.2)
RBC #/AREA URNS HPF: ABNORMAL /HPF (ref 0–5)
SAMPLES BEING HELD,HOLD: NORMAL
SERVICE CMNT-IMP: NORMAL
SODIUM SERPL-SCNC: 140 MMOL/L (ref 136–145)
SP GR UR REFRACTOMETRY: 1.01 (ref 1–1.03)
TRIGL SERPL-MCNC: 84 MG/DL (ref ?–150)
TROPONIN-HIGH SENSITIVITY: 9 NG/L (ref 0–51)
UR CULT HOLD, URHOLD: NORMAL
UROBILINOGEN UR QL STRIP.AUTO: 0.2 EU/DL (ref 0.2–1)
VENTRICULAR RATE, ECG03: 69 BPM
VLDLC SERPL CALC-MCNC: 16.8 MG/DL
WBC # BLD AUTO: 4.5 K/UL (ref 3.6–11)
WBC URNS QL MICRO: ABNORMAL /HPF (ref 0–4)

## 2022-12-04 PROCEDURE — 71045 X-RAY EXAM CHEST 1 VIEW: CPT

## 2022-12-04 PROCEDURE — 74011250636 HC RX REV CODE- 250/636: Performed by: EMERGENCY MEDICINE

## 2022-12-04 PROCEDURE — 74011250636 HC RX REV CODE- 250/636: Performed by: INTERNAL MEDICINE

## 2022-12-04 PROCEDURE — 36415 COLL VENOUS BLD VENIPUNCTURE: CPT

## 2022-12-04 PROCEDURE — 74011000636 HC RX REV CODE- 636: Performed by: EMERGENCY MEDICINE

## 2022-12-04 PROCEDURE — 0042T CT CODE NEURO PERF W CBF: CPT

## 2022-12-04 PROCEDURE — 80061 LIPID PANEL: CPT

## 2022-12-04 PROCEDURE — 84484 ASSAY OF TROPONIN QUANT: CPT

## 2022-12-04 PROCEDURE — 81001 URINALYSIS AUTO W/SCOPE: CPT

## 2022-12-04 PROCEDURE — G0378 HOSPITAL OBSERVATION PER HR: HCPCS

## 2022-12-04 PROCEDURE — 74011250637 HC RX REV CODE- 250/637: Performed by: INTERNAL MEDICINE

## 2022-12-04 PROCEDURE — 80053 COMPREHEN METABOLIC PANEL: CPT

## 2022-12-04 PROCEDURE — 74011250637 HC RX REV CODE- 250/637: Performed by: EMERGENCY MEDICINE

## 2022-12-04 PROCEDURE — 83036 HEMOGLOBIN GLYCOSYLATED A1C: CPT

## 2022-12-04 PROCEDURE — A9576 INJ PROHANCE MULTIPACK: HCPCS | Performed by: RADIOLOGY

## 2022-12-04 PROCEDURE — 95706 EEG WO VID 2-12HR INTMT MNTR: CPT | Performed by: EMERGENCY MEDICINE

## 2022-12-04 PROCEDURE — 70496 CT ANGIOGRAPHY HEAD: CPT

## 2022-12-04 PROCEDURE — 70553 MRI BRAIN STEM W/O & W/DYE: CPT

## 2022-12-04 PROCEDURE — 74011250636 HC RX REV CODE- 250/636: Performed by: RADIOLOGY

## 2022-12-04 PROCEDURE — 85025 COMPLETE CBC W/AUTO DIFF WBC: CPT

## 2022-12-04 PROCEDURE — 85610 PROTHROMBIN TIME: CPT

## 2022-12-04 PROCEDURE — 82962 GLUCOSE BLOOD TEST: CPT

## 2022-12-04 PROCEDURE — 93005 ELECTROCARDIOGRAM TRACING: CPT

## 2022-12-04 PROCEDURE — 74011000250 HC RX REV CODE- 250: Performed by: INTERNAL MEDICINE

## 2022-12-04 PROCEDURE — 99285 EMERGENCY DEPT VISIT HI MDM: CPT

## 2022-12-04 PROCEDURE — 70450 CT HEAD/BRAIN W/O DYE: CPT

## 2022-12-04 RX ORDER — GUAIFENESIN 100 MG/5ML
324 LIQUID (ML) ORAL
Status: COMPLETED | OUTPATIENT
Start: 2022-12-04 | End: 2022-12-04

## 2022-12-04 RX ORDER — SODIUM CHLORIDE 0.9 % (FLUSH) 0.9 %
5-40 SYRINGE (ML) INJECTION AS NEEDED
Status: DISCONTINUED | OUTPATIENT
Start: 2022-12-04 | End: 2022-12-05 | Stop reason: HOSPADM

## 2022-12-04 RX ORDER — ATORVASTATIN CALCIUM 20 MG/1
80 TABLET, FILM COATED ORAL
Status: COMPLETED | OUTPATIENT
Start: 2022-12-04 | End: 2022-12-04

## 2022-12-04 RX ORDER — GUAIFENESIN 100 MG/5ML
81 LIQUID (ML) ORAL DAILY
Status: DISCONTINUED | OUTPATIENT
Start: 2022-12-05 | End: 2022-12-05 | Stop reason: HOSPADM

## 2022-12-04 RX ORDER — ACETAMINOPHEN 325 MG/1
650 TABLET ORAL
Status: DISCONTINUED | OUTPATIENT
Start: 2022-12-04 | End: 2022-12-05 | Stop reason: HOSPADM

## 2022-12-04 RX ORDER — NALOXONE HYDROCHLORIDE 0.4 MG/ML
0.4 INJECTION, SOLUTION INTRAMUSCULAR; INTRAVENOUS; SUBCUTANEOUS AS NEEDED
Status: DISCONTINUED | OUTPATIENT
Start: 2022-12-04 | End: 2022-12-05 | Stop reason: HOSPADM

## 2022-12-04 RX ORDER — ONDANSETRON 2 MG/ML
4 INJECTION INTRAMUSCULAR; INTRAVENOUS
Status: DISCONTINUED | OUTPATIENT
Start: 2022-12-04 | End: 2022-12-05 | Stop reason: HOSPADM

## 2022-12-04 RX ORDER — SODIUM CHLORIDE, SODIUM LACTATE, POTASSIUM CHLORIDE, CALCIUM CHLORIDE 600; 310; 30; 20 MG/100ML; MG/100ML; MG/100ML; MG/100ML
75 INJECTION, SOLUTION INTRAVENOUS CONTINUOUS
Status: DISCONTINUED | OUTPATIENT
Start: 2022-12-04 | End: 2022-12-05 | Stop reason: HOSPADM

## 2022-12-04 RX ORDER — ENOXAPARIN SODIUM 100 MG/ML
40 INJECTION SUBCUTANEOUS EVERY 24 HOURS
Status: DISCONTINUED | OUTPATIENT
Start: 2022-12-04 | End: 2022-12-05 | Stop reason: HOSPADM

## 2022-12-04 RX ORDER — SODIUM CHLORIDE 0.9 % (FLUSH) 0.9 %
5-40 SYRINGE (ML) INJECTION EVERY 8 HOURS
Status: DISCONTINUED | OUTPATIENT
Start: 2022-12-04 | End: 2022-12-05 | Stop reason: HOSPADM

## 2022-12-04 RX ORDER — CLOPIDOGREL BISULFATE 75 MG/1
300 TABLET ORAL
Status: COMPLETED | OUTPATIENT
Start: 2022-12-04 | End: 2022-12-04

## 2022-12-04 RX ORDER — LABETALOL HCL 20 MG/4 ML
5 SYRINGE (ML) INTRAVENOUS
Status: DISCONTINUED | OUTPATIENT
Start: 2022-12-04 | End: 2022-12-05 | Stop reason: HOSPADM

## 2022-12-04 RX ORDER — ATORVASTATIN CALCIUM 20 MG/1
80 TABLET, FILM COATED ORAL DAILY
Status: DISCONTINUED | OUTPATIENT
Start: 2022-12-04 | End: 2022-12-05 | Stop reason: HOSPADM

## 2022-12-04 RX ADMIN — IOPAMIDOL 100 ML: 755 INJECTION, SOLUTION INTRAVENOUS at 12:23

## 2022-12-04 RX ADMIN — SODIUM CHLORIDE, POTASSIUM CHLORIDE, SODIUM LACTATE AND CALCIUM CHLORIDE 75 ML/HR: 600; 310; 30; 20 INJECTION, SOLUTION INTRAVENOUS at 20:03

## 2022-12-04 RX ADMIN — ATORVASTATIN CALCIUM 80 MG: 20 TABLET, FILM COATED ORAL at 12:54

## 2022-12-04 RX ADMIN — CLOPIDOGREL BISULFATE 300 MG: 75 TABLET ORAL at 12:54

## 2022-12-04 RX ADMIN — SODIUM CHLORIDE, PRESERVATIVE FREE 10 ML: 5 INJECTION INTRAVENOUS at 23:13

## 2022-12-04 RX ADMIN — ATORVASTATIN CALCIUM 80 MG: 20 TABLET, FILM COATED ORAL at 18:23

## 2022-12-04 RX ADMIN — IOPAMIDOL 50 ML: 755 INJECTION, SOLUTION INTRAVENOUS at 12:23

## 2022-12-04 RX ADMIN — ASPIRIN 324 MG: 81 TABLET, CHEWABLE ORAL at 12:54

## 2022-12-04 RX ADMIN — SODIUM CHLORIDE 1000 ML: 9 INJECTION, SOLUTION INTRAVENOUS at 12:10

## 2022-12-04 RX ADMIN — GADOTERIDOL 10 ML: 279.3 INJECTION, SOLUTION INTRAVENOUS at 19:34

## 2022-12-04 NOTE — ED PROVIDER NOTES
Clifford Bocanegra is a 79 yo F with h/o dementia who developed sudden garbled speech and confusion about 1 hour ago at the end of breakfast.  She lives with her son and daughter in law who had been eating with her and noticed the acute change. No past medical history on file. No past surgical history on file. Family History:   Problem Relation Age of Onset    Dementia Other     Diabetes Other        Social History     Socioeconomic History    Marital status:      Spouse name: Not on file    Number of children: Not on file    Years of education: Not on file    Highest education level: Not on file   Occupational History    Not on file   Tobacco Use    Smoking status: Never    Smokeless tobacco: Never   Vaping Use    Vaping Use: Never used   Substance and Sexual Activity    Alcohol use: Not Currently    Drug use: Never    Sexual activity: Not on file   Other Topics Concern    Not on file   Social History Narrative    Not on file     Social Determinants of Health     Financial Resource Strain: Not on file   Food Insecurity: Not on file   Transportation Needs: Not on file   Physical Activity: Not on file   Stress: Not on file   Social Connections: Not on file   Intimate Partner Violence: Not on file   Housing Stability: Not on file         ALLERGIES: Penicillamine    Review of Systems   Constitutional:  Negative for fever. HENT:  Negative for sore throat. Eyes:  Negative for visual disturbance. Respiratory:  Negative for cough. Cardiovascular:  Negative for chest pain. Gastrointestinal:  Negative for abdominal pain. Genitourinary:  Negative for dysuria. Musculoskeletal:  Negative for back pain. Skin:  Negative for rash. Neurological:  Positive for speech difficulty. Negative for headaches. Psychiatric/Behavioral:  Positive for confusion.       Vitals:    12/04/22 1104 12/04/22 1116 12/04/22 1130   BP: (!) 159/78  (!) 153/67   Pulse: 79 72 66   Resp: 16  25   SpO2: 98%  100% Weight: 56.7 kg (125 lb)     Height: 5' 3\" (1.6 m)              Physical Exam  Vitals and nursing note reviewed. Constitutional:       General: She is not in acute distress. Appearance: She is well-developed. HENT:      Head: Normocephalic and atraumatic. Mouth/Throat:      Mouth: Mucous membranes are moist.   Eyes:      Extraocular Movements: Extraocular movements intact. Conjunctiva/sclera: Conjunctivae normal.   Neck:      Trachea: Phonation normal.   Cardiovascular:      Rate and Rhythm: Normal rate. Pulmonary:      Effort: Pulmonary effort is normal. No respiratory distress. Abdominal:      General: There is no distension. Musculoskeletal:         General: No tenderness. Normal range of motion. Cervical back: Normal range of motion. Skin:     General: Skin is warm and dry. Neurological:      General: No focal deficit present. Mental Status: She is alert. She is not disoriented. Cranial Nerves: Dysarthria present. Sensory: Sensation is intact. Motor: Motor function is intact. No weakness, abnormal muscle tone or pronator drift. ED EKG interpretation:  Rhythm: normal sinus rhythm; and regular . Rate (approx.): 69; Axis: normal; P wave: normal; QRS interval: normal ; ST/T wave: normal; Other findings: normal. This EKG was interpreted by Adeola Acevedo MD,ED Provider. [facility]         11:36 AM  Dsicussed with Dr. Maggi Roman, Teleneurology. Will assess patient. Recommend starting IVF. 11:55 AM  Dr. Maggi Roman has evaluated patient who is now at baseline. Recommend no TPA, start lipitor 80mg, 325mg aspirin and 300mg plavix. Obtain CTA/perfusion Admit for MRI without contrast. Agree with checking troponin and UA.      1:38 PM  Alerted by Radiology of perfusion mismatch on right but with minimal stenosis of M2 M3 on left. Updated teleneurology, Dr. Maggi Roman. Perfusion mismatch could be due to seizure which may have been the cause of symptoms rather than stroke. Recommend EEG and including Contrast with MRI. Perfect Serve Consult for Admission  1:41 PM    ED Room Number: ER15/15  Patient Name and age:  Joseph Sargent 80 y.o.  female  Working Diagnosis:   1. Stroke-like symptoms        COVID-19 Suspicion:  no  Sepsis present:  no  Reassessment needed: N/A  Code Status:  Full Code  Readmission: no  Isolation Requirements:  no  Recommended Level of Care:  telemetry  Department:[facility] ED - [phone]  Other:  onset of stroke-like symptoms of garbled speech and confusion which started suddenly around 10am when she was at breakfast with son. Had returned to baseline at time of teleneuro evaluation however CTA/perfusion study with large perfusion mismatch on RIGHT side with on large vessel occlusion and only minimal stenosis of M2M3 segments on LEFT. Given perfusion findings Teleneuro is considering seizure in addition to CVA and recommends MRI with and without contrast and EEG in addition to previously ordered ASA, Plavis and Lipitor. I have ordered ceribell now.       Procedures

## 2022-12-04 NOTE — ED NOTES
Verbal shift change report given to Roma RN (oncoming nurse) by Wilmer RN (offgoing nurse). Report included the following information SBAR, Kardex, ED Summary, MAR, and Recent Results.

## 2022-12-04 NOTE — ED NOTES
Stroke Education provided to caregiver / friend and the following topics were discussed    1. Patients personal risk factors for stroke are none    2. Warning signs of Stroke:        * Sudden numbness or weakness of the face, arm or leg, especially on one side of          The body            * Sudden confusion, trouble speaking or understanding        * Sudden trouble seeing in one or both eyes        * Sudden trouble walking, dizziness, loss of balance or coordination        * Sudden severe headache with no known cause      3. Importance of activation Emergency Medical Services ( 9-1-1 ) immediately if experience any warning signs of stroke. 4. Be sure and schedule a follow-up appointment with your primary care doctor or any specialists as instructed. 5. You must take medicine every day to treat your risk factors for stroke. Be sure to take your medicines exactly as your doctor tells you: no more, no less. Know what your medicines are for , what they do. Anti-thrombotics /anticoagulants can help prevent strokes. You are taking the following medicine(s)  multivitamins     6. Smoking and second-hand smoke greatly increase your risk of stroke, cardiovascular disease and death. Smoking history never    7. Information provided was Verbal Education    8. Documentation of teaching completed in Patient Education Activity and on Care Plan with teaching response noted?   no

## 2022-12-04 NOTE — H&P
[facility]  [address] [facility] [address], [facility]  [phone]    Admission History and Physical      NAME:  Vidal Gallegos   :   1931   MRN:  [id number]     PCP:  Taryn Clemons MD     Date/Time:  2022         Subjective:     CHIEF COMPLAINT: \"I don't know\"     HISTORY OF PRESENT ILLNESS:     Ms. Tommie Parry is a 80 y.o.  female with PMH of XOL per family but has been off statin due to issues swallowing and compliance with meds admitted for expressive aphasia. Per pt's family at roughly [facility] 51 had profoundly garbled  speech. This lasted an hour and then stopped. No prior h/o such. No other deficits noted from family. Pt is VERY hard of hearing at baseline and confused at times     PMH   XOL     No past surgical history on file. Social History     Tobacco Use    Smoking status: Never    Smokeless tobacco: Never   Substance Use Topics    Alcohol use: Not Currently        Family History   Problem Relation Age of Onset    Dementia Other     Diabetes Other         Allergies   Allergen Reactions    Penicillamine Other (comments)        Prior to Admission medications    Medication Sig Start Date End Date Taking? Authorizing Provider   multivitamin (ONE A DAY) tablet Take 1 Tablet by mouth daily. Provider, Historical   denosumab (Prolia) 60 mg/mL injection 60 mg by SubCUTAneous route. Every 6 months    Provider, Historical   OTHER [id number]   Taryn Clemons MD   cholecalciferol, vitamin D3, 50 mcg (2,000 unit) tab Take  by mouth daily. Provider, Historical   vit A/vit C/vit E/zinc/copper (PRESERVISION AREDS PO) Take  by mouth. Provider, Historical   B.infantis-B.ani-B.long-B.bifi 10-15 mg TbEC Take  by mouth. Provider, Historical   FIBER CHOICE PO Take  by mouth every evening.     Provider, Historical         Review of Systems:  (bold if positive, if negative)    Gen:  Eyes:  ENT:  CVS:  Pulm:  GI:  :  MS:  Skin:  Psych:  Endo:  Hem: Renal:  Neuro:     Changes in speech        Objective:      VITALS:    Vital signs reviewed; most recent are:    Visit Vitals  BP (!) 130/58   Pulse 64   Resp 19   Ht 5' 3\" (1.6 m)   Wt 56.7 kg (125 lb)   SpO2 98%   BMI 22.14 kg/m²     SpO2 Readings from Last 6 Encounters:   12/04/22 98%   09/29/22 96%   09/08/22 96%   05/20/22 98%   04/16/22 97%   11/05/21 97%          Intake/Output Summary (Last 24 hours) at 12/4/2022 1809  Last data filed at 12/4/2022 1743  Gross per 24 hour   Intake 1000 ml   Output --   Net 1000 ml            Exam:     Physical Exam:    Gen:  Frail elderly female. NAD. Very hard of hearing   HEENT:  Pink conjunctivae, PERRL, moist mucous membranes  Neck:  Supple, without masses, thyroid non-tender  Resp:  No accessory muscle use, clear breath sounds without wheezes rales or rhonchi  Card:  No murmurs, normal S1, S2 without thrills, bruits or peripheral edema  Abd:  Soft, non-tender, non-distended, normoactive bowel sounds are present, no palpable organomegaly  Lymph:  No cervical adenopathy  Musc:  No cyanosis or clubbing  Skin:  No rashes or ulcers, skin turgor is good  Neuro:  Cranial nerves 3-12 are grossly intact,  strength is 5/5 bilaterally, dorsi / plantarflexion strength is 5/5 bilaterally, follows commands appropriately  Psych: Moderate insight        Labs:    Recent Labs     12/04/22  1121   WBC 4.5   HGB 12.3   HCT 37.9        Recent Labs     12/04/22  1121      K 4.3      CO2 29   *   BUN 13   CREA 0.83   CA 10.8*   ALB 3.9   ALT 33     No components found for: GLPOC  No results for input(s): PH, PCO2, PO2, HCO3, FIO2 in the last 72 hours. Recent Labs     12/04/22  1121   INR 1.0     Head CT =>   No acute intracranial process. There is mild cervical and cerebral atherosclerotic vasculopathy  Suggested area of reversible ischemia in the anterior right frontal lobe,, large  measuring 88 mL without concordant large vessel M2/M3 vessel occlusion.   There is no major vessel occlusion. Mild chronic microvascular ischemic change and cerebral atrophy. There is no aneurysm or dissection identified. Assessment/Plan:    Expressive aphasia - suspect 2/2 CVA  -admit   -serial neurochecks  -in ED loaded with ASA/plavix/statin  -continue ASA/plavix  -lipid panel, A1c pending   -MRI  -recent TTE without e/o PFO per family   -PT/OT/speech eval  -s/p Ceribelle in the ED   -permissive HTN for now     XOL   -prior h/o such. Lipid panel pending.  On statin now     Surrogate decision maker: Pt's son    Total time spent with patient: [address] discussed with: Patient and family     Discussed:  Code Status and Care Plan    Prophylaxis:  SCD's    Probable Disposition:  Home w/Family           ___________________________________________________    Attending Physician: Hodan Longoria MD

## 2022-12-04 NOTE — ED TRIAGE NOTES
Pt arrives with son with complaints of garbled speech and increased confusion with decreased command following starting at 1000 today. He reports history of severe dementia. Dr. Lj Marie at bedside.      Signs and symptoms: increased confusion and garbled speech   Code Stroke activation time: 1056  Provider at bedside time:  1056  VAN score: Negative  Last Known Well (Time): 1000  Blood Glucose Result/Time: 114   Blood Pressure: 159/78  Anticoagulants (List medications): no

## 2022-12-05 ENCOUNTER — APPOINTMENT (OUTPATIENT)
Dept: NON INVASIVE DIAGNOSTICS | Age: 87
End: 2022-12-05
Attending: PSYCHIATRY & NEUROLOGY
Payer: MEDICARE

## 2022-12-05 VITALS
BODY MASS INDEX: 22.15 KG/M2 | DIASTOLIC BLOOD PRESSURE: 65 MMHG | RESPIRATION RATE: 20 BRPM | SYSTOLIC BLOOD PRESSURE: 147 MMHG | OXYGEN SATURATION: 96 % | TEMPERATURE: 98.4 F | WEIGHT: 125 LBS | HEART RATE: 75 BPM | HEIGHT: 63 IN

## 2022-12-05 LAB
ECHO AO ARCH DIAM: 3 CM
ECHO AO ASC DIAM: 3.2 CM
ECHO AO ASCENDING AORTA INDEX: 2.03 CM/M2
ECHO AR MAX VEL PISA: 3.2 M/S
ECHO AV AREA PEAK VELOCITY: 2 CM2
ECHO AV AREA VTI: 2.1 CM2
ECHO AV AREA/BSA PEAK VELOCITY: 1.3 CM2/M2
ECHO AV AREA/BSA VTI: 1.3 CM2/M2
ECHO AV MEAN GRADIENT: 8 MMHG
ECHO AV MEAN VELOCITY: 1.3 M/S
ECHO AV PEAK GRADIENT: 14 MMHG
ECHO AV PEAK VELOCITY: 1.9 M/S
ECHO AV REGURGITANT PHT: 556 MILLISECOND
ECHO AV VELOCITY RATIO: 0.58
ECHO AV VTI: 42.5 CM
ECHO LA DIAMETER INDEX: 2.03 CM/M2
ECHO LA DIAMETER: 3.2 CM
ECHO LA VOL 2C: 28 ML (ref 22–52)
ECHO LA VOL 4C: 23 ML (ref 22–52)
ECHO LA VOL BP: 26 ML (ref 22–52)
ECHO LA VOL/BSA BIPLANE: 16 ML/M2 (ref 16–34)
ECHO LA VOLUME AREA LENGTH: 29 ML
ECHO LA VOLUME INDEX A2C: 18 ML/M2 (ref 16–34)
ECHO LA VOLUME INDEX A4C: 15 ML/M2 (ref 16–34)
ECHO LA VOLUME INDEX AREA LENGTH: 18 ML/M2 (ref 16–34)
ECHO LV E' LATERAL VELOCITY: 6 CM/S
ECHO LV E' SEPTAL VELOCITY: 4 CM/S
ECHO LV EDV A2C: 44 ML
ECHO LV EDV A4C: 44 ML
ECHO LV EDV BP: 47 ML (ref 56–104)
ECHO LV EDV INDEX A4C: 28 ML/M2
ECHO LV EDV INDEX BP: 30 ML/M2
ECHO LV EDV NDEX A2C: 28 ML/M2
ECHO LV EJECTION FRACTION A2C: 61 %
ECHO LV EJECTION FRACTION A4C: 77 %
ECHO LV EJECTION FRACTION BIPLANE: 70 % (ref 55–100)
ECHO LV ESV A2C: 17 ML
ECHO LV ESV A4C: 10 ML
ECHO LV ESV BP: 14 ML (ref 19–49)
ECHO LV ESV INDEX A2C: 11 ML/M2
ECHO LV ESV INDEX A4C: 6 ML/M2
ECHO LV ESV INDEX BP: 9 ML/M2
ECHO LV FRACTIONAL SHORTENING: 29 % (ref 28–44)
ECHO LV INTERNAL DIMENSION DIASTOLE INDEX: 2.66 CM/M2
ECHO LV INTERNAL DIMENSION DIASTOLIC: 4.2 CM (ref 3.9–5.3)
ECHO LV INTERNAL DIMENSION SYSTOLIC INDEX: 1.9 CM/M2
ECHO LV INTERNAL DIMENSION SYSTOLIC: 3 CM
ECHO LV IVSD: 0.9 CM (ref 0.6–0.9)
ECHO LV MASS 2D: 118.7 G (ref 67–162)
ECHO LV MASS INDEX 2D: 75.1 G/M2 (ref 43–95)
ECHO LV POSTERIOR WALL DIASTOLIC: 0.9 CM (ref 0.6–0.9)
ECHO LV RELATIVE WALL THICKNESS RATIO: 0.43
ECHO LVOT AREA: 3.1 CM2
ECHO LVOT AV VTI INDEX: 0.64
ECHO LVOT DIAM: 2 CM
ECHO LVOT MEAN GRADIENT: 3 MMHG
ECHO LVOT PEAK GRADIENT: 5 MMHG
ECHO LVOT PEAK VELOCITY: 1.1 M/S
ECHO LVOT STROKE VOLUME INDEX: 54.5 ML/M2
ECHO LVOT SV: 86 ML
ECHO LVOT VTI: 27.4 CM
ECHO MV A VELOCITY: 1.31 M/S
ECHO MV E DECELERATION TIME (DT): 374.1 MS
ECHO MV E VELOCITY: 0.78 M/S
ECHO MV E/A RATIO: 0.6
ECHO MV E/E' LATERAL: 13
ECHO MV E/E' RATIO (AVERAGED): 16.25
ECHO MV E/E' SEPTAL: 19.5
ECHO MV REGURGITANT PEAK GRADIENT: 34 MMHG
ECHO MV REGURGITANT PEAK VELOCITY: 2.9 M/S
ECHO PV MAX VELOCITY: 1.1 M/S
ECHO PV PEAK GRADIENT: 5 MMHG
ECHO RV FREE WALL PEAK S': 16 CM/S
ECHO RV INTERNAL DIMENSION: 2.9 CM
ECHO RV TAPSE: 2.4 CM (ref 1.7–?)
ECHO RVOT PEAK GRADIENT: 4 MMHG
ECHO RVOT PEAK VELOCITY: 1 M/S
ECHO TV REGURGITANT MAX VELOCITY: 1.79 M/S
ECHO TV REGURGITANT PEAK GRADIENT: 13 MMHG

## 2022-12-05 PROCEDURE — 97116 GAIT TRAINING THERAPY: CPT

## 2022-12-05 PROCEDURE — 97161 PT EVAL LOW COMPLEX 20 MIN: CPT

## 2022-12-05 PROCEDURE — 74011000250 HC RX REV CODE- 250: Performed by: INTERNAL MEDICINE

## 2022-12-05 PROCEDURE — 95816 EEG AWAKE AND DROWSY: CPT | Performed by: PSYCHIATRY & NEUROLOGY

## 2022-12-05 PROCEDURE — 74011250637 HC RX REV CODE- 250/637: Performed by: INTERNAL MEDICINE

## 2022-12-05 PROCEDURE — 97165 OT EVAL LOW COMPLEX 30 MIN: CPT

## 2022-12-05 PROCEDURE — 92610 EVALUATE SWALLOWING FUNCTION: CPT

## 2022-12-05 PROCEDURE — 99222 1ST HOSP IP/OBS MODERATE 55: CPT | Performed by: PSYCHIATRY & NEUROLOGY

## 2022-12-05 PROCEDURE — 93306 TTE W/DOPPLER COMPLETE: CPT | Performed by: INTERNAL MEDICINE

## 2022-12-05 PROCEDURE — G0378 HOSPITAL OBSERVATION PER HR: HCPCS

## 2022-12-05 PROCEDURE — 95717 EEG PHYS/QHP 2-12 HR W/O VID: CPT | Performed by: PSYCHIATRY & NEUROLOGY

## 2022-12-05 PROCEDURE — 97535 SELF CARE MNGMENT TRAINING: CPT

## 2022-12-05 PROCEDURE — 93306 TTE W/DOPPLER COMPLETE: CPT

## 2022-12-05 PROCEDURE — 95819 EEG AWAKE AND ASLEEP: CPT | Performed by: PSYCHIATRY & NEUROLOGY

## 2022-12-05 PROCEDURE — 97530 THERAPEUTIC ACTIVITIES: CPT

## 2022-12-05 RX ORDER — GUAIFENESIN 100 MG/5ML
81 LIQUID (ML) ORAL DAILY
Qty: 30 TABLET | Refills: 0 | Status: SHIPPED | OUTPATIENT
Start: 2022-12-06 | End: 2023-01-05

## 2022-12-05 RX ORDER — ATORVASTATIN CALCIUM 80 MG/1
80 TABLET, FILM COATED ORAL DAILY
Qty: 30 TABLET | Refills: 0 | Status: SHIPPED | OUTPATIENT
Start: 2022-12-06 | End: 2023-01-05

## 2022-12-05 RX ADMIN — ASPIRIN 81 MG: 81 TABLET, CHEWABLE ORAL at 09:13

## 2022-12-05 RX ADMIN — SODIUM CHLORIDE, PRESERVATIVE FREE 10 ML: 5 INJECTION INTRAVENOUS at 09:13

## 2022-12-05 RX ADMIN — ATORVASTATIN CALCIUM 80 MG: 20 TABLET, FILM COATED ORAL at 09:13

## 2022-12-05 NOTE — PROGRESS NOTES
Reason for Admission:  Speech disturbance                     RUR Score:     N/A                Plan for utilizing home health: Had [facility] in the past & would like them again      PCP: First and Last name:  Sejal Torres MD     Name of Practice:    Are you a current patient: Yes/No: yes   Approximate date of last visit:   October 2022   Can you participate in a virtual visit with your PCP:  yes                    Current Advanced Directive/Advance Care Plan: DNR  Has an AD at home    Healthcare Decision Maker:   Click here to [address] including selection of the Healthcare Decision Maker Relationship (ie \"Primary\")             Primary Decision Maker: Gen Medley - Son - [phone]                  Transition of Care Plan:                    Met with pt, her son & daughter-in-law for d/c planning. Pt lives with her son & his wife in a 2 story house with 3 entry steps and 13 interior stairs. Pt needs prompting to complete ADL's but is able to complete them. Family drives for her. Pt \"furniture walks\" at home and uses a RW when she goes to [facility] adult day care 3x/wk. Medications are from [facility] on [employer]. PT/OT/SLP following--recommending  PT  Referral sent to [facility]  Neurology following  CM following for d/c needs  Family to transport pt home at d/c    Care Management Interventions  PCP Verified by CM: Yes  Mode of Transport at Discharge: Other (see comment)  Transition of Care Consult (CM Consult): Home Health, Discharge  [place]  [address] [address]: (S) No  Reason Outside [facility]: Patient already serviced by other home care/hospice agency  Physical Therapy Consult: Yes  Occupational Therapy Consult: Yes  Speech Therapy Consult: Yes  Support Systems: Child(mariza)  Confirm Follow Up Transport: Family  Discharge Location  Patient Expects to be Discharged to[de-identified] Home with home health  ORTIZ Hay

## 2022-12-05 NOTE — PROCEDURES
[facility] [place] 79     Electroencephalogram Report    Procedure ID: SFA  Procedure Date: 12/05/2022   Patient Name: Gifty Singleton YOB: 1931   Procedure Type: Routine Medical Record No: [id number]     INDICATION: Altered mental status    STATE:  Awake and sleep . DESCRIPTION OF PROCEDURE: Electrodes were applied in accordance with the international 10-20 system of electrode placement. EEG was reviewed in both bipolar and referential montages. Description of Activity:  During wakefulness, there is continuous runs of 8 Hz symmetric posterior alpha rhythm, that attenuate symmetrically with eye opening. Low voltage beta activity occurs symmetrically at the anterior head regions bilaterally. During drowsiness, there is attenuation of the alpha rhythm and low voltage theta activity occurs bilaterally. Sleep spindles occur and are symmetric. Intermittent photic stimulation was performed and did not induce posterior driving responses. No sharp or spike discharges, seizures or epileptiform discharges seen. No focal asymmetry. Clinical Interpretation: This EEG, performed during wakefulness and sleep is normal. There is no focal asymmetry, seizures or epileptiform discharges seen.        Medications:  Current Facility-Administered Medications   Medication Dose Route Frequency    sodium chloride (NS) flush 5-40 mL  5-40 mL IntraVENous Q8H    sodium chloride (NS) flush 5-40 mL  5-40 mL IntraVENous PRN    acetaminophen (TYLENOL) tablet 650 mg  650 mg Oral Q4H PRN    naloxone (NARCAN) injection 0.4 mg  0.4 mg IntraVENous PRN    ondansetron (ZOFRAN) injection 4 mg  4 mg IntraVENous Q4H PRN    [Held by provider] enoxaparin (LOVENOX) injection 40 mg  40 mg SubCUTAneous Q24H    lactated Ringers infusion  75 mL/hr IntraVENous CONTINUOUS    labetaloL (NORMODYNE;TRANDATE) 20 mg/4 mL (5 mg/mL) injection 5 mg  5 mg IntraVENous Q10MIN PRN    aspirin chewable tablet 81 mg  81 mg Oral DAILY    atorvastatin (LIPITOR) tablet 80 mg  80 mg Oral DAILY     Current Outpatient Medications   Medication Sig    [START ON 12/6/2022] aspirin 81 mg chewable tablet Take 1 Tablet by mouth daily for 30 days. [START ON 12/6/2022] atorvastatin (LIPITOR) 80 mg tablet Take 1 Tablet by mouth daily for 30 days. multivitamin (ONE A DAY) tablet Take 1 Tablet by mouth daily. cholecalciferol, vitamin D3, 50 mcg (2,000 unit) tab Take  by mouth daily. FIBER CHOICE PO Take  by mouth every evening. denosumab (Prolia) 60 mg/mL injection 60 mg by SubCUTAneous route. Every 6 months    B.infantis-B.ani-B.long-B.bifi 10-15 mg TbEC Take  by mouth.

## 2022-12-05 NOTE — PROGRESS NOTES
CM Note:  [facility] accepted [facility] date 12/10 (at the latest). AVS sent via [facility].   JESSI Moreno

## 2022-12-05 NOTE — PROGRESS NOTES
Problem: Falls - Risk of  Goal: *Absence of Falls  Description: Document Luis Morgan Fall Risk and appropriate interventions in the flowsheet.   Outcome: Progressing Towards Goal  Note: Fall Risk Interventions:  Mobility Interventions: Bed/chair exit alarm, OT consult for ADLs, Patient to call before getting OOB, PT Consult for mobility concerns    Mentation Interventions: Adequate sleep, hydration, pain control, Bed/chair exit alarm    Medication Interventions: Bed/chair exit alarm, Patient to call before getting OOB, Teach patient to arise slowly    Elimination Interventions: Bed/chair exit alarm, Call light in reach, Patient to call for help with toileting needs              Problem: Patient Education: Go to Patient Education Activity  Goal: Patient/Family Education  Outcome: Progressing Towards Goal     Problem: TIA/CVA Stroke: 0-24 hours  Goal: Off Pathway (Use only if patient is Off Pathway)  Outcome: Progressing Towards Goal  Goal: Activity/Safety  Outcome: Progressing Towards Goal  Goal: Consults, if ordered  Outcome: Progressing Towards Goal  Goal: Diagnostic Test/Procedures  Outcome: Progressing Towards Goal  Goal: Nutrition/Diet  Outcome: Progressing Towards Goal  Goal: Discharge Planning  Outcome: Progressing Towards Goal  Goal: Medications  Outcome: Progressing Towards Goal  Goal: Respiratory  Outcome: Progressing Towards Goal  Goal: Treatments/Interventions/Procedures  Outcome: Progressing Towards Goal  Goal: Minimize risk of bleeding post-thrombolytic infusion  Outcome: Progressing Towards Goal  Goal: Monitor for complications post-thrombolytic infusion  Outcome: Progressing Towards Goal  Goal: Psychosocial  Outcome: Progressing Towards Goal  Goal: *Hemodynamically stable  Outcome: Progressing Towards Goal  Goal: *Neurologically stable  Description: Absence of additional neurological deficits    Outcome: Progressing Towards Goal  Goal: *Verbalizes anxiety and depression are reduced or absent  Outcome: Progressing Towards Goal  Goal: *Absence of Signs of Aspiration on Current Diet  Outcome: Progressing Towards Goal  Goal: *Absence of deep venous thrombosis signs and symptoms(Stroke Metric)  Outcome: Progressing Towards Goal  Goal: *Ability to perform ADLs and demonstrates progressive mobility and function  Outcome: Progressing Towards Goal  Goal: *Stroke education started(Stroke Metric)  Outcome: Progressing Towards Goal  Goal: *Dysphagia screen performed(Stroke Metric)  Outcome: Progressing Towards Goal  Goal: *Rehab consulted(Stroke Metric)  Outcome: Progressing Towards Goal     Problem: TIA/CVA Stroke: Day 2 Until Discharge  Goal: Off Pathway (Use only if patient is Off Pathway)  Outcome: Progressing Towards Goal  Goal: Activity/Safety  Outcome: Progressing Towards Goal  Goal: Diagnostic Test/Procedures  Outcome: Progressing Towards Goal  Goal: Nutrition/Diet  Outcome: Progressing Towards Goal  Goal: Discharge Planning  Outcome: Progressing Towards Goal  Goal: Medications  Outcome: Progressing Towards Goal  Goal: Respiratory  Outcome: Progressing Towards Goal  Goal: Treatments/Interventions/Procedures  Outcome: Progressing Towards Goal  Goal: Psychosocial  Outcome: Progressing Towards Goal  Goal: *Verbalizes anxiety and depression are reduced or absent  Outcome: Progressing Towards Goal  Goal: *Absence of aspiration  Outcome: Progressing Towards Goal  Goal: *Absence of deep venous thrombosis signs and symptoms(Stroke Metric)  Outcome: Progressing Towards Goal  Goal: *Optimal pain control at patient's stated goal  Outcome: Progressing Towards Goal  Goal: *Tolerating diet  Outcome: Progressing Towards Goal  Goal: *Ability to perform ADLs and demonstrates progressive mobility and function  Outcome: Progressing Towards Goal  Goal: *Stroke education continued(Stroke Metric)  Outcome: Progressing Towards Goal     Problem: Ischemic Stroke: Discharge Outcomes  Goal: *Verbalizes anxiety and depression are reduced or absent  Outcome: Progressing Towards Goal  Goal: *Verbalize understanding of risk factor modification(Stroke Metric)  Outcome: Progressing Towards Goal  Goal: *Hemodynamically stable  Outcome: Progressing Towards Goal  Goal: *Absence of aspiration pneumonia  Outcome: Progressing Towards Goal  Goal: *Aware of needed dietary changes  Outcome: Progressing Towards Goal  Goal: *Verbalize understanding of prescribed medications including anti-coagulants, anti-lipid, and/or anti-platelets(Stroke Metric)  Outcome: Progressing Towards Goal  Goal: *Tolerating diet  Outcome: Progressing Towards Goal  Goal: *Aware of follow-up diagnostics related to anticoagulants  Outcome: Progressing Towards Goal  Goal: *Ability to perform ADLs and demonstrates progressive mobility and function  Outcome: Progressing Towards Goal  Goal: *Absence of DVT(Stroke Metric)  Outcome: Progressing Towards Goal  Goal: *Absence of aspiration  Outcome: Progressing Towards Goal  Goal: *Optimal pain control at patient's stated goal  Outcome: Progressing Towards Goal  Goal: *Home safety concerns addressed  Outcome: Progressing Towards Goal  Goal: *Describes available resources and support systems  Outcome: Progressing Towards Goal  Goal: *Verbalizes understanding of activation of EMS(911) for stroke symptoms(Stroke Metric)  Outcome: Progressing Towards Goal  Goal: *Understands and describes signs and symptoms to report to providers(Stroke Metric)  Outcome: Progressing Towards Goal  Goal: *Neurolgocially stable (absence of additional neurological deficits)  Outcome: Progressing Towards Goal  Goal: *Verbalizes importance of follow-up with primary care physician(Stroke Metric)  Outcome: Progressing Towards Goal  Goal: *Smoking cessation discussed,if applicable(Stroke Metric)  Outcome: Progressing Towards Goal  Goal: *Depression screening completed(Stroke Metric)  Outcome: Progressing Towards Goal

## 2022-12-05 NOTE — PROCEDURES
[facility] [place] [facility]     Electroencephalogram Report    Procedure ID:  Procedure Date: 12/04/2022   Patient Name: Thony Garza YOB: 1931   Procedure Type:  rapid EEG  Medical Record No: [id number]     INDICATION: Altered mental status    STATE:  awake and sleep . Description of procedure: This EEG was obtained using a 10 lead, 8 channel system positioned circumferentially without any parasagittal coverage (rapid EEG). Computer selected EEG is reviewed as well as background features and all clinically significant events. Clarity algorithm utilized and implemented to provide analysis of underlying activity and seizure detection used to facilitate reading. A total of 3 hours and 33 minutes of recording was done. Description of recording: During maximal wakefulness, the background activity of this  EEG consists of a posterior dominant rhythm of approximately 8 Hz that is well-  developed, symmetric, and reactive to eye-opening. This background activity slows with  the onset of drowsiness. Sleep was recorded. No epileptiform discharges, focal slowing, or electrographic  seizures were noted throughout the recording. Impression: Normal EEG during the awake and sleep states. Comment: A normal EEG does not rule out the diagnosis of a seizure disorder; clinical  correlation is advised. If there is still persistent suspicion for continued seizure-like  activity, would advise obtaining an electroencephalogram with the 10-20 international  system for improved spatial resolution and parasagittal coverage.        Medications:  Current Facility-Administered Medications   Medication Dose Route Frequency    sodium chloride (NS) flush 5-40 mL  5-40 mL IntraVENous Q8H    sodium chloride (NS) flush 5-40 mL  5-40 mL IntraVENous PRN    acetaminophen (TYLENOL) tablet 650 mg  650 mg Oral Q4H PRN    naloxone (NARCAN) injection 0.4 mg  0.4 mg IntraVENous PRN    ondansetron (ZOFRAN) injection 4 mg  4 mg IntraVENous Q4H PRN    [Held by provider] enoxaparin (LOVENOX) injection 40 mg  40 mg SubCUTAneous Q24H    lactated Ringers infusion  75 mL/hr IntraVENous CONTINUOUS    labetaloL (NORMODYNE;TRANDATE) 20 mg/4 mL (5 mg/mL) injection 5 mg  5 mg IntraVENous Q10MIN PRN    aspirin chewable tablet 81 mg  81 mg Oral DAILY    atorvastatin (LIPITOR) tablet 80 mg  80 mg Oral DAILY     Current Outpatient Medications   Medication Sig    [START ON 12/6/2022] aspirin 81 mg chewable tablet Take 1 Tablet by mouth daily for 30 days. [START ON 12/6/2022] atorvastatin (LIPITOR) 80 mg tablet Take 1 Tablet by mouth daily for 30 days. multivitamin (ONE A DAY) tablet Take 1 Tablet by mouth daily. cholecalciferol, vitamin D3, 50 mcg (2,000 unit) tab Take  by mouth daily. FIBER CHOICE PO Take  by mouth every evening. denosumab (Prolia) 60 mg/mL injection 60 mg by SubCUTAneous route. Every 6 months    B.infantis-B.ani-B.long-B.bifi 10-15 mg TbEC Take  by mouth.

## 2022-12-05 NOTE — DISCHARGE INSTRUCTIONS
HOSPITALIST DISCHARGE INSTRUCTIONS  NAME: Rosanna Alba   :  1931   MRN:  [id number]     Date/Time:  2022 1:51 PM    ADMIT DATE: 2022     DISCHARGE DATE: 2022     ADMITTING DIAGNOSIS:  TIA     DISCHARGE DIAGNOSIS:  As above     MEDICATIONS:     It is important that you take the medication exactly as they are prescribed. Keep your medication in the bottles provided by the pharmacist and keep a list of the medication names, dosages, and times to be taken in your wallet. Do not take other medications without consulting your doctor. Pain Management: per above medications    What to do at Home    Recommended diet:  Regular Diet    Recommended activity: Activity as tolerated. No driving for 6 months     If you experience any of the following symptoms then please call your primary care physician or return to the emergency room if you cannot get hold of your doctor:  Fever, chills, nausea, vomiting, diarrhea, change in mentation, falling, bleeding, shortness of breath, chest pain       Information obtained by :  I understand that if any problems occur once I am at home I am to contact my physician. I understand and acknowledge receipt of the instructions indicated above.                                                                                                                                            Physician's or R.N.'s Signature                                                                  Date/Time                                                                                                                                              Patient or Representative Signature                                                          Date/Time

## 2022-12-05 NOTE — PROGRESS NOTES
SPEECH PATHOLOGY BEDSIDE SWALLOW EVALUATION/DISCHARGE  Patient: Marina Barnes (5 y.o. female)  Date: 12/5/2022  Primary Diagnosis: Speech disturbance [R47.9]       Precautions: Aspiration  (P) Fall    ASSESSMENT :  Pt is a 79 yo female admitted on 12/4 with son due to c/o garbled speech and increased confusion with decreased command following. Pt with hx of severe dementia and severely [place]. Pt is currently on her baseline diet of regular and thin liquids per her daughter-in-law. Pts daughter -in -law reports that she is a picky eater and prefers sweet and salty foods without any hx of dysphagia. Pt observed with thin liquids via cup, applesauce and cracker. Pt with ability to self feed, functional bolus acceptance, functional but slow mastication with bolus control and cohesion, no anterior spillage or residue after the swallow, appropriate timely and without overt s/s of aspiration. Pt is independently using small bites and small sips. Based on the objective data described below, the patient presents with functional swallow for a regular diet with thin liquids and suspect she will continue to choose softer solids. Pt is at an increased risk for aspiration due to her age and severe dementia and should continue to adhere to strict aspiration and reflux precautions. Education provided with caregivers, son and daughter-in-law, at beside with teachback. Skilled acute therapy provided by a speech-language pathologist is not indicated at this time. PLAN :  Recommendations:  --regular diet, thin liquids  --upright for all PO intake  --remain upright for at least 30 minutes after PO intake  --medications whole with water or applesauce as tolerated  --speech s/o    Discharge Recommendations: None     SUBJECTIVE:   Patient stated hi. OBJECTIVE:   No past medical history on file. No past surgical history on file.   Prior Level of Function/Home Situation:   Home Situation  Home Environment: (P) Private residence  One/Two Story Residence: (P) Two story  Living Alone: (P) No  Support Systems: (P) Child(mariza)  Patient Expects to be Discharged to[de-identified] (P) Home with home health  Current DME Used/Available at Home: (P) rony Petersen  Diet prior to admission: regular, thin  Current Diet:  regular, thin   Cognitive and Communication Status:  Neurologic State: Alert  Orientation Level: Oriented to person, Disoriented to place, Disoriented to situation, Disoriented to time  Cognition: Decreased attention/concentration, Decreased command following, Follows commands, Impulsive, Memory loss, Poor safety awareness           Oral Assessment:  Oral Assessment  Labial: No impairment  Dentition: Natural  Oral Hygiene: clear and moist  Lingual: No impairment  Velum: Unable to visualize  Mandible: No impairment  P.O. Trials:  Patient Position: upright OOB in chair  Vocal quality prior to P.O.: Low volume  Consistency Presented: Thin liquid; Solid;Puree  How Presented: Self-fed/presented;Cup/sip;Spoon     Bolus Acceptance: No impairment  Bolus Formation/Control: No impairment     Propulsion:  (mildly delayed but functional)  Oral Residue: None  Initiation of Swallow: No impairment  Laryngeal Elevation: Functional  Aspiration Signs/Symptoms: None        NOMS:   The NOMS functional outcome measure was used to quantify this patient's level of swallowing impairment. Based on the NOMS, the patient was determined to be at level 6 for swallow function     NOMS Swallowing Levels:  Level 1 (CN): NPO  Level 2 (CM): NPO but takes consistency in therapy  Level 3 (CL): Takes less than 50% of nutrition p.o. and continues with nonoral feedings; and/or safe with mod cues; and/or max diet restriction  Level 4 (CK):  Safe swallow but needs mod cues; and/or mod diet restriction; and/or still requires some nonoral feeding/supplements  Level 5 (CJ): Safe swallow with min diet restriction; and/or needs min cues  Level 6 (CI): Independent with p.o.; rare cues; usually self cues; may need to avoid some foods or needs extra time  Level 7 Pending sale to [facility]): Independent for all p.o.  SANDRA. (2003). National Outcomes Measurement System (NOMS): Adult Speech-Language Pathology User's Guide. Pain:  Pain Scale 1: Numeric (0 - 10)  Pain Intensity 1: 0     After treatment:   Patient left in no apparent distress sitting up in chair, Call bell within reach, Nursing notified, and Caregiver / family present    COMMUNICATION/EDUCATION:     The patient's plan of care including recommendations, planned interventions, and recommended diet changes were discussed with: Registered nurse.      Thank you for this referral.  LUAN Nguyen  Time Calculation: 20 mins

## 2022-12-05 NOTE — PROGRESS NOTES
TRANSFER - OUT REPORT:    Verbal report given to Amada Zazueta RN (name) on [place]  being transferred to [facility](unit) for routine progression of care       Report consisted of patients Situation, Background, Assessment and   Recommendations(SBAR). Information from the following report(s) SBAR, Intake/Output, MAR, Recent Results, Cardiac Rhythm NSR, and Alarm Parameters  was reviewed with the receiving nurse. Lines:   Peripheral IV 12/04/22 Anterior;Proximal;Right Forearm (Active)   Site Assessment Clean, dry, & intact 12/04/22 2010   Phlebitis Assessment 0 12/04/22 2010   Infiltration Assessment 0 12/04/22 2010   Dressing Status Clean, dry, & intact 12/04/22 2010   Dressing Type Transparent 12/04/22 2010   Hub Color/Line Status Pink 12/04/22 2010   Action Taken Open ports on tubing capped 12/04/22 2010   Alcohol Cap Used Yes 12/04/22 2010        Opportunity for questions and clarification was provided. Patient transported with:   Monitor  Registered Nurse      Called the Son and notified him of patient moving to bed 329 on [facility]. Patient belongings moved to 200 with patient.

## 2022-12-05 NOTE — PROGRESS NOTES
OCCUPATIONAL THERAPY EVALUATION/DISCHARGE  Patient: Zan Mckenzie (89 y.o. female)  Date: 12/5/2022  Primary Diagnosis: Speech disturbance [R47.9]       Precautions:  Fall    ASSESSMENT  Based on the objective data described below, the patient presents near her baseline in regards to functional abilities following admission 12/4/22 for speech impairment and confusion. Patient has a history of dementia but is well supported by family (son and daughter in law). Patient performed transfers with supervision at most and cues for safety. She engaged in ADLs with good tolerance. Patient anticipates discharge home today with family assistance. Current Level of Function (ADLs/self-care): Supervision, functional mobility; Independent-CGA, ADLs         PLAN :    Recommendation for discharge: (in order for the patient to meet his/her long term goals)  Occupational therapy at least 2 days/week in the home     This discharge recommendation:  Has been made in collaboration with the attending provider and/or case management    IF patient discharges home will need the following DME: none       SUBJECTIVE:   Patient agreeable to OT evaluation. OBJECTIVE DATA SUMMARY:   HISTORY:   No past medical history on file. No past surgical history on file. Prior Level of Function/Environment/Context: Patient lives with son and DIL. Expanded or extensive additional review of patient history:   Home Situation  Home Environment: Private residence  One/Two Story Residence: Two story, live on 1st floor  Living Alone: No  Support Systems: Child(mariza)  Patient Expects to be Discharged to[de-identified] Home with home health  Current DME Used/Available at Home: Donta العلي, rollator, Shower chair, Commode, bedside    Hand dominance: Right    EXAMINATION OF PERFORMANCE DEFICITS:  Cognitive/Behavioral Status:  Neurologic State: Alert  Orientation Level: Oriented to person;Disoriented to place; Disoriented to situation;Disoriented to time  Cognition: Decreased command following;Decreased attention/concentration; Follows commands; Impulsive;Memory loss;Poor safety awareness      Skin: Intact in the uppers    Edema: None noted in the uppers    Hearing: Auditory  Auditory Impairment: Hard of hearing, bilateral    Vision/Perceptual:    Tracking: Able to track stimulus in all quadrants w/o difficulty                 Diplopia: No              Range of Motion:  AROM: Within functional limits  PROM: Within functional limits    Strength:  Strength: Within functional limits    Coordination:     Fine Motor Skills-Upper: Left Intact; Right Intact    Gross Motor Skills-Upper: Left Intact; Right Intact    Tone & Sensation:   Tone: normal  Sensation: intact     Balance:  Sitting: Intact  Standing: Intact; With support    Functional Mobility and Transfers for ADLs:  Bed Mobility:  Rolling: Supervision  Supine to Sit: Supervision  Sit to Supine: Supervision    Transfers:  Sit to Stand: Supervision  Stand to Sit: Supervision  Bed to Chair: Supervision  Bathroom Mobility: Supervision/set up    ADL Assessment:  Feeding: Independent    Oral Facial Hygiene/Grooming: Modified Independent    Bathing: Contact guard assistance     Upper Body Dressing: Modified independent    Lower Body Dressing: Contact guard assistance    Toileting: Supervision     Occupational Therapy Evaluation Charge Determination   History Examination Decision-Making   LOW Complexity : Brief history review  LOW Complexity : 1-3 performance deficits relating to physical, cognitive , or psychosocial skils that result in activity limitations and / or participation restrictions  LOW Complexity : No comorbidities that affect functional and no verbal or physical assistance needed to complete eval tasks       Based on the above components, the patient evaluation is determined to be of the following complexity level: LOW     Activity Tolerance:   Good    After treatment patient left in no apparent distress:    Sitting in chair, Call bell within reach, Bed / chair alarm activated, and Caregiver / family present    COMMUNICATION/EDUCATION:   The patients plan of care was discussed with: Physical therapist, Registered nurse, and patient .      Thank you for this referral.  Johnny Tyler, OTR/L  Time Calculation: 40 mins

## 2022-12-05 NOTE — CONSULTS
NEUROLOGY CONSULT NOTE    Patient ID:  Joey Haley  [id number]  24 y.o.  5/18/1931    Date of Consultation:  December 5, 2022    Referring Physician: Amina Sellers MD    Reason for Consultation:  speech issues and confusion    History of Present Illness:     Patient Active Problem List    Diagnosis Date Noted    Speech disturbance 12/04/2022    Bilateral impacted cerumen 09/29/2022    Dizziness 09/29/2022    Acute cystitis without hematuria 09/08/2022    Medicare annual wellness visit, subsequent 09/08/2022    Seborrheic keratosis 09/08/2022    Other hydronephrosis 05/20/2022    Anxiety 05/20/2022    Pruritus 05/20/2022    Urine frequency 05/20/2022    Laceration of right middle finger without foreign body without damage to nail 05/20/2022    Murmur 11/05/2021    COVID-19 11/05/2021    Late onset Alzheimer's dementia without behavioral disturbance ([facility] [address] 11/05/2021    Macular degeneration 11/05/2021    Vaginal pain 11/05/2021    Hyperparathyroidism ([facility] [address] 11/05/2021    Osteoporosis 11/05/2021    History of onychomycosis 11/05/2021    Urinary incontinence 11/05/2021    Dyspnea on exertion 11/05/2021    Near syncope 11/05/2021     No past medical history on file. No past surgical history on file. Prior to Admission medications    Medication Sig Start Date End Date Taking? Authorizing Provider   multivitamin (ONE A DAY) tablet Take 1 Tablet by mouth daily. Provider, Historical   denosumab (Prolia) 60 mg/mL injection 60 mg by SubCUTAneous route. Every 6 months    Provider, Historical   OTHER [id number] 4/7/44   Iqra Ferrell MD   cholecalciferol, vitamin D3, 50 mcg (2,000 unit) tab Take  by mouth daily. Provider, Historical   vit A/vit C/vit E/zinc/copper (PRESERVISION AREDS PO) Take  by mouth. Provider, Historical   B.infantis-B.ani-B.long-B.bifi 10-15 mg TbEC Take  by mouth. Provider, Historical   FIBER CHOICE PO Take  by mouth every evening.     Provider, Historical     Allergies   Allergen Reactions    Penicillamine Other (comments)      Social History     Tobacco Use    Smoking status: Never    Smokeless tobacco: Never   Substance Use Topics    Alcohol use: Not Currently      Family History   Problem Relation Age of Onset    Dementia Other     Diabetes Other         Subjective:      Navarro Avelar is a 80 y.o. female with history of anxiety, macular degeneration, Alzheimer's, COVID-19 and hyperparathyroidism who was admitted from the ER for transient episodes of garbled speech and confusion. Apparently day of admission at the end of eating breakfast patient noted to have garbled speech and confusion. Patient was brought to the ER. In the ER blood pressure was 159/78. NIH stroke scale was 0. Laboratory work-up revealed elevated potassium at 10.8, elevated total cholesterol 254 and elevated LDL at 149.2. Unremarkable glucose, CBC, PT, INR, CMP, troponin, urinalysis and hemoglobin A1c. EKG revealed normal sinus rhythm. Chest x-ray was negative. Head CT without contrast revealed no acute intracranial abnormality. Minimal paranasal sinus inflammatory disease. Mild periventricular white matter disease. Head and neck CTA revealed no acute intracranial process. Mild cervical and cerebral atherosclerotic vasculopathy. Perfusion study revealed suggestion of an area of reversible ischemia in the anterior right frontal lobe with no vessel occlusion. Brain MRI with and without contrast did not reveal any acute stroke. No abnormal enhancement. Mild periventricular white matter disease also involving the corona radiata and centrum semiovale. Moderate temporal predominant cerebral atrophy. Teleneurology was consulted. Deemed not a candidate for TNKase. Rapid EEG did not reveal any seizure or focal asymmetry. Event lasted one hour and resolved when seen in the ER. Hard of hearing at baseline.     Review of records revealed:  PCP office visit note dated 9/29/2022 mentions issues with late onset Alzheimer's dementia. Patient needs help with pulling up pants and cleaning after bathroom. Uses Rollator for long distances. Holds onto son for shorter distances. Mostly sits in a chair all day and occasionally walks around the house. MWF goes to [facility] where she mostly sits in the chair. Baseline right eye blindness. When seen, per family patient is back to baseline. Outside reports reviewed: office notes, ER records, radiology reports, lab reports. Review of Systems:    A comprehensive review of systems was done and were negative except for those mentioned in the HPI. Objective:     Patient Vitals for the past 8 hrs:   BP Temp Pulse Resp SpO2   12/05/22 0739 136/67 97.7 °F (36.5 °C) 68 18 95 %   12/05/22 0351 (!) 150/71 97.6 °F (36.4 °C) 70 18 96 %     PHYSICAL EXAM:    NEUROLOGICAL EXAM:    Appearance: The patient is well developed, well nourished, unable to provide a coherent history and is in no acute distress. Mental Status: Oriented to person. Fluent, no aphasia or dysarthria. Poor recent and remote memory. Poor attention and concentration. Able to name objects. Good repetition. Poor fund of knowledge. Mood and affect appropriate. Cranial Nerves:   Intact visual fields. MARY, EOM's full, no nystagmus, no ptosis. Facial sensation is normal. Corneal reflexes are intact. Facial movement is symmetric. Significant hearing loss bilaterally. Palate is midline with normal elevation. Sternocleidomastoid and trapezius muscles are normal. Tongue is midline. Motor:  5/5 strength in upper and lower proximal and distal muscles. Normal bulk and tone. No fasciculations. No pronator drift. Arthritic deformities bilateral hands. Reflexes:   Deep tendon reflexes 1+/4 and symmetrical. Downgoing toes. Sensory:   Normal to cold, pinprick and vibration. Gait:  Steady with some assist.    Tremor:   No tremor noted. Cerebellar:  Intact FTN/DAGMAR/HTS.    Neurovascular:  Normal heart sounds and regular rhythm, peripheral pulses intact, and no carotid bruits. Imaging  CT Head, head/neck CTA, brain MRI: reviewed    Lab Review    Recent Results (from the past 24 hour(s))   GLUCOSE, POC    Collection Time: 12/04/22 11:01 AM   Result Value Ref Range    Glucose (POC) 114 65 - 117 mg/dL    Performed by Rolando Garcia    CBC WITH AUTOMATED DIFF    Collection Time: 12/04/22 11:21 AM   Result Value Ref Range    WBC 4.5 3.6 - 11.0 K/uL    RBC 4.03 3.80 - 5.20 M/uL    HGB 12.3 11.5 - 16.0 g/dL    HCT 37.9 35.0 - 47.0 %    MCV 94.0 80.0 - 99.0 FL    MCH 30.5 26.0 - 34.0 PG    MCHC 32.5 30.0 - 36.5 g/dL    RDW 13.2 11.5 - 14.5 %    PLATELET 139 045 - 158 K/uL    MPV 10.4 8.9 - 12.9 FL    NRBC 0.0 0  WBC    ABSOLUTE NRBC 0.00 0.00 - 0.01 K/uL    NEUTROPHILS 58 32 - 75 %    LYMPHOCYTES 28 12 - 49 %    MONOCYTES 8 5 - 13 %    EOSINOPHILS 5 0 - 7 %    BASOPHILS 1 0 - 1 %    IMMATURE GRANULOCYTES 0 0.0 - 0.5 %    ABS. NEUTROPHILS 2.6 1.8 - 8.0 K/UL    ABS. LYMPHOCYTES 1.2 0.8 - 3.5 K/UL    ABS. MONOCYTES 0.4 0.0 - 1.0 K/UL    ABS. EOSINOPHILS 0.2 0.0 - 0.4 K/UL    ABS. BASOPHILS 0.1 0.0 - 0.1 K/UL    ABS. IMM. GRANS. 0.0 0.00 - 0.04 K/UL    DF AUTOMATED     METABOLIC PANEL, COMPREHENSIVE    Collection Time: 12/04/22 11:21 AM   Result Value Ref Range    Sodium 140 136 - 145 mmol/L    Potassium 4.3 3.5 - 5.1 mmol/L    Chloride 108 97 - 108 mmol/L    CO2 29 21 - 32 mmol/L    Anion gap 3 (L) 5 - 15 mmol/L    Glucose 106 (H) 65 - 100 mg/dL    BUN 13 6 - 20 MG/DL    Creatinine 0.83 0.55 - 1.02 MG/DL    BUN/Creatinine ratio 16 12 - 20      eGFR >60 >60 ml/min/1.73m2    Calcium 10.8 (H) 8.5 - 10.1 MG/DL    Bilirubin, total 0.5 0.2 - 1.0 MG/DL    ALT (SGPT) 33 12 - 78 U/L    AST (SGOT) 27 15 - 37 U/L    Alk.  phosphatase 61 45 - 117 U/L    Protein, total 7.6 6.4 - 8.2 g/dL    Albumin 3.9 3.5 - 5.0 g/dL    Globulin 3.7 2.0 - 4.0 g/dL    A-G Ratio 1.1 1.1 - 2.2     PROTHROMBIN TIME + INR    Collection Time: 12/04/22 11:21 AM   Result Value Ref Range    INR 1.0 0.9 - 1.1      Prothrombin time 10.3 9.0 - 11.1 sec   TROPONIN-HIGH SENSITIVITY    Collection Time: 12/04/22 11:21 AM   Result Value Ref Range    Troponin-High Sensitivity 9 0 - 51 ng/L   SAMPLES BEING HELD    Collection Time: 12/04/22 11:21 AM   Result Value Ref Range    SAMPLES BEING HELD SST. RED     COMMENT        Add-on orders for these samples will be processed based on acceptable specimen integrity and analyte stability, which may vary by analyte.    LIPID PANEL    Collection Time: 12/04/22 11:21 AM   Result Value Ref Range    Cholesterol, total 254 (H) <200 MG/DL    Triglyceride 84 <150 MG/DL    HDL Cholesterol 88 MG/DL    LDL, calculated 149.2 (H) 0 - 100 MG/DL    VLDL, calculated 16.8 MG/DL    CHOL/HDL Ratio 2.9 0.0 - 5.0     HEMOGLOBIN A1C WITH EAG    Collection Time: 12/04/22 11:21 AM   Result Value Ref Range    Hemoglobin A1c 5.4 4.0 - 5.6 %    Est. average glucose 108 mg/dL   EKG, 12 LEAD, INITIAL    Collection Time: 12/04/22 11:26 AM   Result Value Ref Range    Ventricular Rate 69 BPM    Atrial Rate 69 BPM    P-R Interval 168 ms    QRS Duration 80 ms    Q-T Interval 372 ms    QTC Calculation (Bezet) 398 ms    Calculated P Axis 45 degrees    Calculated R Axis 32 degrees    Calculated T Axis 41 degrees    Diagnosis       Normal sinus rhythm  Normal ECG  When compared with ECG of 16-APR-2022 13:27,  No significant change was found  Confirmed by Frank Dodson MD., Alfred ([id number]) on 12/4/2022 8:26:11 PM     URINALYSIS W/MICROSCOPIC    Collection Time: 12/04/22 12:16 PM   Result Value Ref Range    Color YELLOW/STRAW      Appearance CLEAR CLEAR      Specific gravity 1.010 1.003 - 1.030      pH (UA) 7.0 5.0 - 8.0      Protein Negative NEG mg/dL    Glucose Negative NEG mg/dL    Ketone Negative NEG mg/dL    Bilirubin Negative NEG      Blood TRACE (A) NEG      Urobilinogen 0.2 0.2 - 1.0 EU/dL    Nitrites Negative NEG      Leukocyte Esterase Negative NEG      WBC 0-4 0 - 4 /hpf RBC 0-5 0 - 5 /hpf    Epithelial cells FEW FEW /lpf    Bacteria Negative NEG /hpf   URINE CULTURE HOLD SAMPLE    Collection Time: 12/04/22 12:16 PM    Specimen: Urine   Result Value Ref Range    Urine culture hold        Urine on hold in Microbiology dept for 2 days. If unpreserved urine is submitted, it cannot be used for addtional testing after 24 hours, recollection will be required. Assessment:   TIA  Hyperlipidemia  Alzheimer's dementia    Plan:   Neurological examination mainly reveals baseline issues with dementia and significant hearing loss but otherwise no other focal findings. Event is certainly more consistent with a TIA. Head CT without contrast did not reveal any acute process. Mild periventricular white matter disease. Minimal paranasal sinus inflammatory disease. Brain MRI with and without contrast did not reveal any acute stroke. No abnormal enhancement. Mild periventricular white matter disease and also involving the corona radiata and centrum semiovale. Moderate temporal predominant cerebral atrophy. Head and neck CTA did not reveal any flow-limiting stenosis or aneurysm. Mild cervical and cerebral atherosclerotic vasculopathy. Rapid EEG done did not reveal any seizure or focal asymmetry. Routine EEG was ordered to further assess. LDL was elevated. Patient needs to be on high-dose statin therapy. Echocardiogram is pending. Continue aspirin 81 mg daily for stroke prophylaxis. Patient is okay to be discharged from a neurological standpoint once all the work-up is done. Follow-up with outpatient neurology in 4 to 6 weeks. Thank you for the consult. This note was created using voice recognition software. Despite editing, there may be syntax errors.

## 2022-12-05 NOTE — PROGRESS NOTES
0700 Bedside and Verbal shift change report given to Bc Ji (oncoming nurse) by Warden Villagomez (offgoing nurse). Report included the following information SBAR, Kardex, ED Summary, Intake/Output, MAR, Recent Results, and Cardiac Rhythm NSR . This patient was assisted with Intentional Toileting every 2 hours during this shift as appropriate. Documentation of ambulation and output reflected on Flowsheet as appropriate. Purposeful hourly rounding was completed using AIDET and 5Ps. Outcomes of PHR documented as they occurred. Bed alarm in use as appropriate. Dual Suction and ambubag in place. Stroke Education provided to patient and the following topics were discussed    1. Patients personal risk factors for stroke are hypertension, hyperlipidemia, diabetes mellitus, and elevated HgA1C    2. Warning signs of Stroke:        * Sudden numbness or weakness of the face, arm or leg, especially on one side of          The body            * Sudden confusion, trouble speaking or understanding        * Sudden trouble seeing in one or both eyes        * Sudden trouble walking, dizziness, loss of balance or coordination        * Sudden severe headache with no known cause      3. Importance of activation Emergency Medical Services ( 9-1-1 ) immediately if experience any warning signs of stroke. 4. Be sure and schedule a follow-up appointment with your primary care doctor or any specialists as instructed. 5. You must take medicine every day to treat your risk factors for stroke. Be sure to take your medicines exactly as your doctor tells you: no more, no less. Know what your medicines are for , what they do. Anti-thrombotics /anticoagulants can help prevent strokes. You are taking the following medicine(s)  Asa,  Lovenox, Plavix    6. Smoking and second-hand smoke greatly increase your risk of stroke, cardiovascular disease and death. Smoking history never    7.  Information provided was [facility] Stroke Education Milly Breaker or Verbal Education    8. Documentation of teaching completed in Patient Education Activity and on Care Plan with teaching response noted? yes     1630 Discharge paperwork reviewed with Pt and Pt Family, all questions answered. Pt to be discharged home, transported by Son. Home Health set up by case Management to contact family Saturday. Paperwork signed and placed in Pt chart. Follow up appointments discussed with family.

## 2022-12-05 NOTE — PROGRESS NOTES
Problem: Mobility Impaired (Adult and Pediatric)  Goal: *Acute Goals and Plan of Care (Insert Text)  Description: FUNCTIONAL STATUS PRIOR TO ADMISSION: Patient was modified independent using a rollator for functional mobility. HOME SUPPORT PRIOR TO ADMISSION: The patient lived with son and daughter in law but only required supervision require assist.    Physical Therapy Goals  Initiated 12/5/2022  1. Patient will move from supine to sit and sit to supine  in bed with supervision/set-up within 7 day(s). 2.  Patient will transfer from bed to chair and chair to bed with supervision/set-up using the least restrictive device within 7 day(s). 3.  Patient will perform sit to stand with supervision/set-up within 7 day(s). 4.  Patient will ambulate with supervision/set-up for 150 feet with the least restrictive device within 7 day(s). 5.  Patient will ascend/descend 12 stairs with 2 handrail(s) with minimal assistance/contact guard assist within 7 day(s). Outcome: Progressing Towards Goal  Note:   PHYSICAL THERAPY EVALUATION- NEURO POPULATION  Patient: Emilio Hines (61 y.o. female)  Date: 12/5/2022  Primary Diagnosis: Speech disturbance [R47.9]       Precautions:   Fall      ASSESSMENT  Based on the objective data described below, the patient presents with transient expressive aphasia that has now resolved and not had any instance of reoccurrence. Patient underwent CT and MRI which were negative for an acute infarct. Patient is overall CGA with RW for all upright mobility without loss of balance. Patient vitals were stable throughout- see below. Recommend home health at discharge. Current Level of Function Impacting Discharge (mobility/balance): CGA with RW    Functional Outcome Measure: The patient scored Total: 20/56 on the [place]guiano Balance Assessment which is indicative of high fall risk.  These are age related changes  vs. Acute changes    Other factors to consider for discharge:      Patient will benefit from skilled therapy intervention to address the above noted impairments. PLAN :  Recommendations and Planned Interventions: bed mobility training, transfer training, gait training, therapeutic exercises, and therapeutic activities      Frequency/Duration: Patient will be followed by physical therapy:  5 times a week to address goals. Recommendation for discharge: (in order for the patient to meet his/her long term goals)  Physical therapy at least 2 days/week in the home AND ensure assist and/or supervision for safety with upright activity    This discharge recommendation:  Has been made in collaboration with the attending provider and/or case management    IF patient discharges home will need the following DME: none         SUBJECTIVE:   Patient stated .    OBJECTIVE DATA SUMMARY:   HISTORY:    No past medical history on file. No past surgical history on file. Personal factors and/or comorbidities impacting plan of care: see above    Home Situation  Home Environment: Private residence  One/Two Story Residence: Two story, live on 1st floor  Living Alone: No  New Bee: Child(mariza)  Patient Expects to be Discharged to[de-identified] Home  Current DME Used/Available at Home: Tammy Alves, rollator, [address] [facility] chair, Commode, bedside    EXAMINATION/PRESENTATION/DECISION MAKING:   Vitals    Blood pressure Heart rate(bpm) Oxygen saturation  Pre-activity 147/69   71  During activity  Post activity  164/72   74  Critical Behavior:  Neurologic State: Alert  Orientation Level: Oriented to person, Disoriented to place, Disoriented to situation, Disoriented to time  Cognition: Decreased attention/concentration, Decreased command following, Follows commands, Impulsive, Memory loss, Poor safety awareness     Hearing:   Auditory  Auditory Impairment: Hard of hearing, bilateral  Skin:  all exposed intact  Edema: none noted  Range Of Motion:  AROM: Within functional limits           PROM: Within functional limits Strength:    Strength:  Within functional limits                    Tone & Sensation:                                  Coordination:     Vision:      Functional Mobility:  Bed Mobility:  Rolling: Supervision  Supine to Sit: Supervision  Sit to Supine: Supervision     Transfers:  Sit to Stand: Supervision  Stand to Sit: Supervision        Bed to Chair: Supervision              Balance:      Ambulation/Gait Training:  Distance (ft): 100 Feet (ft)  Assistive Device: Walker, rolling;Gait belt        Gait Description (WDL): Exceptions to WDL              Therapeutic Exercises:       Functional Measure  Tao Balance Test:    Sitting to Standing: 3  Standing Unsupported: 2  Sitting with Back Unsupported: 4  Standing to Sitting: 3  Transfers: 3  Standing Unsupported with Eyes Closed: 0  Standing Unsupported with Feet Together: 0  Reach Forward with Outstretched Arm: 0   Object: 3  Turn to Look Over Shoulders: 1  Turn 360 Degrees: 1  Alternate Foot on Step/Stool: 0  Standing Unsupported One Foot in Front: 0  Stand on One Le  Total: 20/56         56=Maximum possible score;   0-20=High fall risk  21-40=Moderate fall risk   41-56=Low fall risk        Physical Therapy Evaluation Charge Determination   History Examination Presentation Decision-Making   MEDIUM  Complexity : 1-2 comorbidities / personal factors will impact the outcome/ POC  MEDIUM Complexity : 3 Standardized tests and measures addressing body structure, function, activity limitation and / or participation in recreation  MEDIUM Complexity : Evolving with changing characteristics  Other outcome measures tao  HIGH       Based on the above components, the patient evaluation is determined to be of the following complexity level: MEDIUM    Pain Rating:  None reported    Activity Tolerance:   Fair    After treatment patient left in no apparent distress:   Sitting in chair, Call bell within reach, and Bed / chair alarm activated    COMMUNICATION/EDUCATION: The patients plan of care was discussed with: Occupational therapist and Registered nurse. Patient was educated regarding her deficit(s) of transient speech as this relates to her diagnosis of TIA. Family demonstrated [address] understanding as evidenced by verbal feedback. Patient and/or family was verbally educated on the BE FAST acronym for signs/symptoms of CVA and TIA. Informed patient to refer to the Stroke Binder for further BE FAST information. All questions answered with patient indicating good understanding. Fall prevention education was provided and the patient/caregiver indicated understanding., Patient/family have participated as able in goal setting and plan of care. , and Patient/family agree to work toward stated goals and plan of care.     Thank you for this referral.  Omer Kaplan, PT, DPT   Time Calculation: 27 mins

## 2022-12-05 NOTE — PROGRESS NOTES
12/05/22      Medicare Outpatient Observation Notice ([facility]ON)/ [place] Outpatient Observation Notice ([place]) provided to patient. Patient not able to hear and was not able to sign notice. Will contact family member on chart to give detailed information about the letter. Tried calling son no answer.

## 2022-12-05 NOTE — ROUTINE PROCESS
Orders received, chart reviewed and patient evaluated by physical therapy. Pending progression with skilled acute physical therapy, recommend:  Physical therapy at least 2 days/week in the home AND ensure assist and/or supervision for safety with upright activity        Full evaluation to follow.     Victoria Carpenter PT,DPT,NCS

## 2022-12-05 NOTE — PROGRESS NOTES
Problem: Falls - Risk of  Goal: *Absence of Falls  Description: Document Tammy Banuelos Fall Risk and appropriate interventions in the flowsheet.   Outcome: Progressing Towards Goal  Note: Fall Risk Interventions:  Mobility Interventions: PT Consult for mobility concerns    Mentation Interventions: Bed/chair exit alarm    Medication Interventions: Bed/chair exit alarm    Elimination Interventions: Bed/chair exit alarm              Problem: Patient Education: Go to Patient Education Activity  Goal: Patient/Family Education  Outcome: Progressing Towards Goal

## 2022-12-06 ENCOUNTER — TELEPHONE (OUTPATIENT)
Dept: INTERNAL MEDICINE CLINIC | Age: 87
End: 2022-12-06

## 2022-12-06 ENCOUNTER — PATIENT OUTREACH (OUTPATIENT)
Dept: CASE MANAGEMENT | Age: 87
End: 2022-12-06

## 2022-12-06 RX ORDER — WHEAT DEXTRIN 3 G/3.5 G
POWDER IN PACKET (EA) ORAL DAILY
COMMUNITY

## 2022-12-06 NOTE — TELEPHONE ENCOUNTER
Nurse have attempted without success to contact this patient by phone nurse  left a voice mail for a call back to discuss how patient was doing after hospital stay and schedule a HARRISON with pcp. Advised for a call back to schedule.

## 2022-12-06 NOTE — ACP (ADVANCE CARE PLANNING)
Patient's daughter-in-law, Parker Simms (on [address] dated 11-5-2021), states patient has a current medical directive and a current POA document. Care Transitions Nurse encouraged the daughter-in-law to take these documents to patient's PCP office to have them scanned into patient's chart. Daughter-in-law states she will do so.

## 2022-12-06 NOTE — PROGRESS NOTES
Care Transitions Initial Call    Call within 2 business days of discharge: Yes     Patient: Lester Ferrell Patient : 1931 MRN: [id number]    Last Discharge  Street       Date Complaint Diagnosis Description Type Department Provider    22 Altered mental status Stroke-like symptoms . .. ED to Hosp-Admission (Discharged) (ADMIT) Elba Garland MD; [facility], ... Was this an external facility discharge? No     Challenges to be reviewed by the provider   Additional needs identified to be addressed with provider: yes  -  Patient is no longer utilizing Fiber Choice PO, and this medication was marked as 'Not Taking' on today's medication reconciliation. Please consider removing Fiber Choice PO from patient's current medication list.   - Utilizing Benefiber Clear, taking 1 teaspoon daily. Benefiber Clear was added to patient's current list of medications as a 'Patient Reported' medication. Method of communication with provider:  chart routing to PCP.      Component      Latest Ref Rng & Units 2022          11:21 AM  1:50 PM   Anion gap      5 - 15 mmol/L 3 (L) 2 (L)     Component      Latest Ref Rng & Units 2022          11:21 AM  1:50 PM   Calcium      8.5 - 10.1 MG/DL 10.8 (H) 10.5 (H)     Component      Latest Ref Rng & Units 2022          11:21 AM   Glucose      65 - 100 mg/dL 106 (H)     Component      Latest Ref Rng & Units 2022          11:01 AM   GLUCOSE,FAST - POC      65 - 117 mg/dL 114     Component      Latest Ref Rng & Units 2022           1:50 PM   Glucose      65 - 100 mg/dL 103 (H)     Component      Latest Ref Rng & Units 2022          11:21 AM   Hemoglobin A1c, (calculated)      4.0 - 5.6 % 5.4   Est. average glucose      mg/dL 108     Component      Latest Ref Rng & Units 2022          11:21 AM   Cholesterol, total      <200 MG/ (H)     Component      Latest Ref Rng & Units 2022          11:21 AM LDL, calculated      0 - 100 MG/.2 (H)     Component      Latest Ref Rng & Units 2022          12:16 PM   Blood      NEG   TRACE (A)     COVID-19 related testing was not completed during this admission. Advance Care Planning:   Does patient have an Advance Directive: yes, reviewed and current per daughter-in-lawAdis (on [address] [place] [address] Road dated 2021). Inpatient Readmission Risk score: No data recorded, not available at the time of patient outreach on 22. Was this a readmission? no   Patient stated reason for the admission: N/A, telephonic assessment completed with patient's daughter-in-lawAdis (on [address] [place], [place] [address]ens Road dated 2021). Patients top risk factors for readmission:  Medical condition - Late onset Alzheimer's dementia without behavorial disturbance, hydronephrosis, acute cystitis without hematuria, anxiety, and recent speech disturbance per chart. Interventions to address risk factors: Obtained and reviewed discharge summary and/or continuity of care documents and Education of patient/family/caregiver/guardian to support self-management-Education provided regarding signs/symptoms of TIA/CVA, daughter-in-law verbalized an understanding. Care Transition Nurse (CTN) contacted the  patient's daughter-in-law (DIL), Adis Danielle (on [address] [place] [address] Road dated 2021,  by telephone to perform post hospital discharge assessment. Verified name and  with  DIL  as identifiers. Provided introduction to self, and explanation of the CTN role. CTN reviewed discharge instructions, medical action plan and red flags with  DIL  who verbalized understanding. Were discharge instructions available to patient? yes. Reviewed appropriate site of care based on symptoms and resources available to patient including: PCP, Specialist, Urgent [address], and When to call 911. The DIL was  given an opportunity to ask questions and does not have any further questions or concerns at this time. The  DIL  agrees to contact the PCP office for questions related to patient's healthcare. Medication reconciliation was performed with  the DIL , who verbalizes understanding of administration of home medications. Advised obtaining a 90-day supply of all daily and as-needed medications. Referral to Pharm D needed: no     Home Health/Outpatient orders at discharge: PT and [address]: At [address] [facility]  Date of initial visit: PT visit is scheduled for 12-10-22 per DIL. Durable Medical Equipment ordered at discharge: None  [address] received: n/a    Was patient discharged with a pulse oximeter? no    Discussed follow-up appointments. If no appointment was previously scheduled, appointment scheduling offered: yes. Is follow up appointment scheduled within 7 days of discharge? No, PCP follow-up appointment is scheduled for 12-21-22 (outside the 14-day window for a [facility] appointment); however 12-21-22 is the first available appointment. DIL states patient has been placed on a wait list in the event of a cancellation. [facility] follow up appointment(s):   Future Appointments   Date Time Provider Johnny Zeng   69/30/2126  4:60 AM Yolis Nunes MD On license of [facility] BS AMB   2/17/2023  8:20 AM [facility]     Non-[facility] follow up appointment(s): None noted at this time. Plan for follow-up call in 10-14 days based on severity of symptoms and risk factors. Plan for next call: self management-Review red flags of TIA/CVA, and follow up appointment-Evaluate if patient is attending follow-up appointments as scheduled, offer assistance with scheduling as needed. CTN provided contact information for future needs. Goals        Understands red flags post discharge.       12-6-22: Red flags of TIA/CVA reviewed with patient's daughter-in-law (DIL), Deisi Lala (on [address] dated 11-5-2021), and DIL verbalized an understanding. DIL denies patient having chest pain, denies shortness of breath, denies fever/chills, and denies nausea vomiting. DIL states patient is utilizing her extremities well, denies numbness/tingling, denies facial droop, and is not experiencing difficulty with her speech. DIL states patient is utilizing a soft regular diet at home, appetite is fair. DIL denies patient having any falls in the last 12 months. DIL states patient attends [facility] 3 days a week to provide socialization. WILL has no red flags to report at this time. Care Transitions Nurse will review red flags again on next phone conversation with son/WILL.   Ml Nichols

## 2022-12-06 NOTE — Clinical Note
Please see patient outreach dated 12-6-22. Patient does not have HARRISON appointment scheduled, next PCP follow-up is currently scheduled for 12-21-22 (first available appointment and outside the [facility] appointment 14-day window).

## 2022-12-06 NOTE — TELEPHONE ENCOUNTER
----- Message from Alexa Leyden sent at 12/5/2022  2:07 PM EST -----  Subject: Hospital Follow Up    QUESTIONS  What hospital was the Patient Discharged from? [facility]   Date of Discharge? 2022-12-05  Discharge Location? Home  Reason for hospitalization as patient stated? hospitalized for speech   disturbance, Admitted on 12/4/2022  What question does the patient have, if applicable?   ---------------------------------------------------------------------------  --------------  [address]  What is the best way for the office to contact you? OK to leave message on   voicemail  Preferred Call Back Phone Number? [phone]  ---------------------------------------------------------------------------  --------------  SCRIPT ANSWERS  Relationship to Patient? Third Party  Third Party Type? Hospital?   Representative Name?  Dalton Guerin

## 2022-12-21 ENCOUNTER — OFFICE VISIT (OUTPATIENT)
Dept: INTERNAL MEDICINE CLINIC | Age: 87
End: 2022-12-21
Payer: MEDICARE

## 2022-12-21 VITALS
OXYGEN SATURATION: 98 % | HEIGHT: 63 IN | WEIGHT: 131.2 LBS | BODY MASS INDEX: 23.25 KG/M2 | SYSTOLIC BLOOD PRESSURE: 141 MMHG | RESPIRATION RATE: 16 BRPM | HEART RATE: 74 BPM | DIASTOLIC BLOOD PRESSURE: 81 MMHG | TEMPERATURE: 98.4 F

## 2022-12-21 DIAGNOSIS — L30.9 DERMATITIS: ICD-10-CM

## 2022-12-21 DIAGNOSIS — G45.9 TIA (TRANSIENT ISCHEMIC ATTACK): Primary | ICD-10-CM

## 2022-12-21 PROCEDURE — G8427 DOCREV CUR MEDS BY ELIG CLIN: HCPCS | Performed by: INTERNAL MEDICINE

## 2022-12-21 PROCEDURE — 1101F PT FALLS ASSESS-DOCD LE1/YR: CPT | Performed by: INTERNAL MEDICINE

## 2022-12-21 PROCEDURE — G0463 HOSPITAL OUTPT CLINIC VISIT: HCPCS | Performed by: INTERNAL MEDICINE

## 2022-12-21 PROCEDURE — 1090F PRES/ABSN URINE INCON ASSESS: CPT | Performed by: INTERNAL MEDICINE

## 2022-12-21 PROCEDURE — G8536 NO DOC ELDER MAL SCRN: HCPCS | Performed by: INTERNAL MEDICINE

## 2022-12-21 PROCEDURE — 99214 OFFICE O/P EST MOD 30 MIN: CPT | Performed by: INTERNAL MEDICINE

## 2022-12-21 PROCEDURE — G8420 CALC BMI NORM PARAMETERS: HCPCS | Performed by: INTERNAL MEDICINE

## 2022-12-21 PROCEDURE — G8510 SCR DEP NEG, NO PLAN REQD: HCPCS | Performed by: INTERNAL MEDICINE

## 2022-12-21 RX ORDER — ROSUVASTATIN CALCIUM 10 MG/1
10 TABLET, COATED ORAL
Qty: 90 TABLET | Refills: 3 | Status: SHIPPED | OUTPATIENT
Start: 2022-12-21

## 2022-12-21 NOTE — ASSESSMENT & PLAN NOTE
Admitted 12/4-5  Couldn't follow commands  Words didn't make sense  Increased confusion   No facial drooping that daughter could tell, hard to tell weakness   Lasted for about a 1.5hr   No DC summary available for review  EEG normal  MRI brain:  IMPRESSION  Mild chronic microvascular ischemic change and moderate temporal predominant  cerebral atrophy. No intracranial mass, hemorrhage or evidence of acute infarction. Chest xray negative    Neuro felt likely TIA. They started her on atorva [de-identified] - caused a lot of diarrhea  Getting home PT/OT  Weaker in the past several months     Discussed goals of care regarding statin. Family wants to keep her comfortable, but not necessarily focus on extending life but open to doing a lower dose statin.   Rec switching to rosuvastatin 10, cont asa 81  Getting HH PT/OT

## 2022-12-21 NOTE — PROGRESS NOTES
Note   Chief Complaint   Meredith Nunez is a 80 y.o. female     Accompanied by daughter in law     1. TIA (transient ischemic attack)  Assessment & Plan:   Admitted 12/4-5  Couldn't follow commands  Words didn't make sense  Increased confusion   No facial drooping that daughter could tell, hard to tell weakness   Lasted for about a 1.5hr   No DC summary available for review  EEG normal  MRI brain:  IMPRESSION  Mild chronic microvascular ischemic change and moderate temporal predominant  cerebral atrophy. No intracranial mass, hemorrhage or evidence of acute infarction. Chest xray negative    Neuro felt likely TIA. They started her on atorva [de-identified] - caused a lot of diarrhea  Getting home PT/OT  Weaker in the past several months     Discussed goals of care regarding statin. Family wants to keep her comfortable, but not necessarily focus on extending life but open to doing a lower dose statin. Rec switching to rosuvastatin 10, cont asa 81  Getting HH PT/OT   Orders:  -     rosuvastatin (CRESTOR) 10 mg tablet; Take 1 Tablet by mouth nightly. Indications: high cholesterol, Normal, Disp-90 Tablet, R-3  2. Dermatitis  Assessment & Plan:  Has developed scaly faintly erythematous macules on her hands. Not itchy, doesn't seem to be bothering her  Skin is very dry  Doesn't think she's been getting bit by anything  Unclear etiology. Offered derm but since not bothering her, opted to monitor. rec thick emollients     Benefits, risks, possible drug interactions, and side effects of all new medications were reviewed with the patient. Pt verbalized understanding. Return to clinic:  6mo for routine  hard of hearing  lives with daughter in law and son    An electronic signature was used to authenticate this note.   Aashish Paniagua MD  [facility]  12/21/2022    Future Appointments   Date Time Provider Johnny Zeng   2/17/2023  8:20 Guido Kidd MD [facility] BS AMB 9/72/2957  5:14 AM Yue Lynch MD [facility] BS AMB        Objective   Vitals:       Visit Vitals  BP (!) 141/81 (BP 1 Location: Right upper arm, BP Patient Position: Sitting, BP Cuff Size: Adult)   Pulse 74   Temp 98.4 °F (36.9 °C) (Oral)   Resp 16   Ht 5' 3\" (1.6 m)   Wt 131 lb 3.2 oz (59.5 kg)   SpO2 98%   BMI 23.24 kg/m²        Physical Exam  Constitutional:       Appearance: Normal appearance. She is not ill-appearing. HENT:      Right Ear: Tympanic membrane and external ear normal. There is impacted cerumen. Left Ear: There is impacted cerumen. Ears:      Comments: Left TM hole noted about 5oclock position   Cardiovascular:      Rate and Rhythm: Normal rate and regular rhythm. Heart sounds: Murmur (2/6 systolic murmur) heard. No friction rub. No gallop. Pulmonary:      Effort: No respiratory distress. Breath sounds: Normal breath sounds. No wheezing, rhonchi or rales. Neurological:      Mental Status: She is alert. Current Outpatient Medications   Medication Sig    rosuvastatin (CRESTOR) 10 mg tablet Take 1 Tablet by mouth nightly. Indications: high cholesterol    Wheat Dextrin (Benefiber Clear) 3 gram/3.5 gram pwpk Take  by mouth daily. Indications: Per DIL: patient takes 1 teaspoon per day. aspirin 81 mg chewable tablet Take 1 Tablet by mouth daily for 30 days. multivitamin (ONE A DAY) tablet Take 1 Tablet by mouth daily. denosumab (Prolia) 60 mg/mL injection 60 mg by SubCUTAneous route. Every 6 months    cholecalciferol, vitamin D3, 50 mcg (2,000 unit) tab Take  by mouth daily. B.infantis-B.ani-B.long-B.bifi 10-15 mg TbEC Take  by mouth. No current facility-administered medications for this visit.

## 2022-12-21 NOTE — ASSESSMENT & PLAN NOTE
Has developed scaly faintly erythematous macules on her hands. Not itchy, doesn't seem to be bothering her  Skin is very dry  Doesn't think she's been getting bit by anything  Unclear etiology. Offered derm but since not bothering her, opted to monitor.  rec thick emollients

## 2022-12-22 ENCOUNTER — PATIENT OUTREACH (OUTPATIENT)
Dept: CASE MANAGEMENT | Age: 87
End: 2022-12-22

## 2022-12-22 NOTE — PROGRESS NOTES
Care Transitions Follow Up Call    Challenges to be reviewed by the provider   Additional needs identified to be addressed with provider: no  None, the daughter-in-law has no red flags to report at this time. Method of communication with provider:  none, daughter-in-law has no red flags to report. Care Transition Nurse (CTN) contacted the  patient's daughter-in-law (DIL), Mikey Hirsch (on [address] dated 22),  by telephone to follow up after admission on 22 to [facility]. Verified name and  with  the DIL  as identifiers. Addressed changes since last contact: none  Follow up appointment completed? yes. Was follow up appointment scheduled within 7 days of discharge? No, patient attended PCP follow-up appointment on 22, and 22 was the first available appointment. .     CTN reviewed discharge instructions, medical action plan and red flags with  the DIL  and discussed any barriers to care and/or understanding of plan of care after discharge. Discussed appropriate site of care based on symptoms and resources available to patient including: PCP, Specialist, Urgent [address], and When to call 911. The  DIL  agrees to contact the PCP office for questions related to patient's healthcare. Patients top risk factors for readmission: Medical condition - Late onset Alzheimer's dementia without behavorial disturbance, hydronephrosis, acute cystitis without hematuria, anxiety, and recent speech disturbance per chart. Interventions to address risk factors: Obtained and reviewed discharge summary and/or continuity of care documents and Education of patient/family/caregiver/guardian to support self-management-Education provided to the DIL regarding signs/symptoms of TIA/CVA, the DIL verbalized an understanding.      REHABILITATION [facility] follow up appointment(s):   Future Appointments   Date Time Provider Johnny Zeng   2023  8:20 Lacey Wei., MD [facility] BS AMB 2/69/7648  6:91 AM Wes Stevenson MD [facility]     Non-[facility] follow up appointment(s): None noted at this time. CTN provided contact information for future needs. Plan for follow-up call in 10-14 days based on severity of symptoms and risk factors. Plan for next call: self management-Review red flags of TIA/CVA, and follow up appointment-Evaluate if patient continues to attend follow-up appointments as scheduled, offer assistance with scheduling as needed. Goals Addressed                   This Visit's Progress     Understands red flags post discharge. 12-6-22: Red flags of TIA/CVA reviewed with patient's daughter-in-law (DIL), Selin Molina (on [address] [place] [address] dated 11-5-2021), and WILL verbalized an understanding. DIL denies patient having chest pain, denies shortness of breath, denies fever/chills, and denies nausea vomiting. DIL states patient is utilizing her extremities well, denies numbness/tingling, denies facial droop, and is not experiencing difficulty with her speech. DIL states patient is utilizing a soft regular diet at home, appetite is fair. DIL denies patient having any falls in the last 12 months. DIL states patient attends [facility] 3 days a week to provide socialization. DIL has no red flags to report at this time. Care Transitions Nurse will review red flags again on next phone conversation with son/DIL. CHIKI     12-22-22:  Red flags of TIA/CVA reviewed with patient's daughter-in-law (DIL), Selin Molina (on [facility] dated 12-), and WILL verbalized an understanding. DIL denies patient having chest pain, denies shortness of breath, denies fever/chills, and denies nausea vomiting. DIL states patient is utilizing her extremities well, denies numbness/tingling, denies facial droop, and is not experiencing difficulty with her speech. DIL has no red flags to report at this time.  Care Transitions Nurse will review red flags again on next phone conversation with son/DILLAN Strong

## 2023-01-06 ENCOUNTER — PATIENT OUTREACH (OUTPATIENT)
Dept: CASE MANAGEMENT | Age: 88
End: 2023-01-06

## 2023-01-06 NOTE — PROGRESS NOTES
Patient has graduated from the Transitions of Care Coordination  program on 1-6-23. Patient/family have the ability to self-manage at this time. Care management goals have been completed. Patient was not referred to the [facility] team for further management. Goals Addressed                   This Visit's Progress     COMPLETED: Understands red flags post discharge. 12-6-22: Red flags of TIA/CVA reviewed with patient's daughter-in-law (DIL), Rosanna Welch (on [facility] 11-5-2021), and DIL verbalized an understanding. DIL denies patient having chest pain, denies shortness of breath, denies fever/chills, and denies nausea vomiting. DIL states patient is utilizing her extremities well, denies numbness/tingling, denies facial droop, and is not experiencing difficulty with her speech. DIL states patient is utilizing a soft regular diet at home, appetite is fair. DIL denies patient having any falls in the last 12 months. DIL states patient attends [facility] 3 days a week to provide socialization. DIL has no red flags to report at this time. Care Transitions Nurse will review red flags again on next phone conversation with son/DIL. CHIKI     12-22-22:  Red flags of TIA/CVA reviewed with patient's daughter-in-law (DIL), Rosanna Welch (on [facility] dated 12-), and DIL verbalized an understanding. DIL denies patient having chest pain, denies shortness of breath, denies fever/chills, and denies nausea/vomiting. DIL states patient is utilizing her extremities well, denies numbness/tingling, denies facial droop, and is not experiencing difficulty with her speech. DIL has no red flags to report at this time. Care Transitions Nurse will review red flags again on next phone conversation with son/DIL. CHIKI     1-6-23: Red flags of TIA/CVA reviewed with patient's daughter-in-law (DIL), Rosanna Welch (on [employer] dated 12-), and DIL verbalized an understanding.  DIL denies patient having chest pain, denies shortness of breath, denies fever/chills, and denies nausea/vomiting. DIL states patient is utilizing her extremities well, denies numbness/tingling, denies facial droop, and is not experiencing difficulty with her speech. DIL has no red flags to report at this time. Goal met. Codie Comer                 Patient's daughter-in-law, Conchita Baron (on [employer] dated 12-21-22), has Care Transition Nurse's contact information for any further questions, concerns, or needs.   Patients upcoming visits:    Future Appointments   Date Time Provider Johnny Zeng   2/17/2023  8:20 AM [facility]   5/25/6301  0:54 AM Shereen Le MD [facility] BS AMB

## 2023-01-25 ENCOUNTER — TELEPHONE (OUTPATIENT)
Dept: INTERNAL MEDICINE CLINIC | Age: 88
End: 2023-01-25

## 2023-01-25 NOTE — TELEPHONE ENCOUNTER
Andrea Lcwendie (Daughter in [place]) brought in an advance medical directive and other paperwork. I am entering this to Dr. Fadia alanis. Please scan into her chart. Thank you.

## 2023-03-30 ENCOUNTER — OFFICE VISIT (OUTPATIENT)
Dept: INTERNAL MEDICINE CLINIC | Age: 88
End: 2023-03-30
Payer: MEDICARE

## 2023-03-30 VITALS
RESPIRATION RATE: 14 BRPM | BODY MASS INDEX: 23.25 KG/M2 | HEIGHT: 63 IN | SYSTOLIC BLOOD PRESSURE: 143 MMHG | OXYGEN SATURATION: 97 % | HEART RATE: 77 BPM | WEIGHT: 131.2 LBS | DIASTOLIC BLOOD PRESSURE: 70 MMHG

## 2023-03-30 DIAGNOSIS — G45.9 TIA (TRANSIENT ISCHEMIC ATTACK): ICD-10-CM

## 2023-03-30 DIAGNOSIS — G30.1 LATE ONSET ALZHEIMER'S DEMENTIA WITHOUT BEHAVIORAL DISTURBANCE (HCC): ICD-10-CM

## 2023-03-30 DIAGNOSIS — E21.3 HYPERPARATHYROIDISM (HCC): ICD-10-CM

## 2023-03-30 DIAGNOSIS — F02.80 LATE ONSET ALZHEIMER'S DEMENTIA WITHOUT BEHAVIORAL DISTURBANCE (HCC): ICD-10-CM

## 2023-03-30 PROCEDURE — 99214 OFFICE O/P EST MOD 30 MIN: CPT | Performed by: INTERNAL MEDICINE

## 2023-03-30 PROCEDURE — G8427 DOCREV CUR MEDS BY ELIG CLIN: HCPCS | Performed by: INTERNAL MEDICINE

## 2023-03-30 PROCEDURE — G8420 CALC BMI NORM PARAMETERS: HCPCS | Performed by: INTERNAL MEDICINE

## 2023-03-30 PROCEDURE — 1090F PRES/ABSN URINE INCON ASSESS: CPT | Performed by: INTERNAL MEDICINE

## 2023-03-30 PROCEDURE — 1101F PT FALLS ASSESS-DOCD LE1/YR: CPT | Performed by: INTERNAL MEDICINE

## 2023-03-30 PROCEDURE — G0463 HOSPITAL OUTPT CLINIC VISIT: HCPCS | Performed by: INTERNAL MEDICINE

## 2023-03-30 PROCEDURE — G8536 NO DOC ELDER MAL SCRN: HCPCS | Performed by: INTERNAL MEDICINE

## 2023-03-30 PROCEDURE — G8510 SCR DEP NEG, NO PLAN REQD: HCPCS | Performed by: INTERNAL MEDICINE

## 2023-03-30 RX ORDER — ASPIRIN 81 MG/1
TABLET ORAL DAILY
COMMUNITY

## 2023-03-30 NOTE — PROGRESS NOTES
Assessment and Plan     Accompanied by daughter in law    1. TIA (transient ischemic attack)  Assessment & Plan:  Diarrhea resolved after switching to lower dose rosuvastatin 10  Continue asa, rosuva 10  2. Hyperparathyroidism ([address]  Assessment & Plan:   monitored by specialist. No acute findings meriting change in the plan  3. Late onset Alzheimer's dementia without behavioral disturbance [facility])  Assessment & Plan:  Paperwork for assisted living provided      Benefits, risks, possible drug interactions, and side effects of all new medications were reviewed with the patient. Pt verbalized understanding. Return to clinic:  as scheduled  hard of hearing  lives with daughter in law and son    An electronic signature was used to authenticate this note. Vale Tafoya MD  [facility]  3/30/2023    Future Appointments   Date Time Provider Johnny Zegn   1/09/5573  9:49 AM Teagan Bullard MD [facility] BS AMB        History of Present Illness   Chief Complaint   Eval for assisted living    Sindi Craft is a 80 y.o. female         Review of Systems   Constitutional:  Negative for chills and fever. Respiratory:  Negative for shortness of breath. Cardiovascular:  Negative for chest pain. Past Medical History     Allergies   Allergen Reactions    Penicillins Unknown (comments)        Current Outpatient Medications   Medication Sig    aspirin delayed-release 81 mg tablet Take  by mouth daily. [address] in 1 - take 1 capsule by mouth one daily    OTHER Centrum Minis Adults 50+ - take 1 cap by mouth once daily    peg 400-propylene glycol (SYSTANE) 0.4-0.3 % drop as needed.  1 drop in each eye every hour as needed for itchy or burning eyes    OTHER Pharmapulse Vaginal Moisturizer, Vulva Balm Cream - apply thin layer twice a day as needed for vulvar burning/itching    OTHER calazime paste twice a day as needed for rectal erythema or irritation OTHER benefiber take 1 tsp in coffee once daily    rosuvastatin (CRESTOR) 10 mg tablet Take 1 Tablet by mouth nightly. Indications: high cholesterol    denosumab (Prolia) 60 mg/mL injection 60 mg by SubCUTAneous route. Every 6 months    cholecalciferol, vitamin D3, 50 mcg (2,000 unit) tab Take 1 Tablet by mouth daily. No current facility-administered medications for this visit. Patient Active Problem List   Diagnosis Code    Murmur R01.1    COVID-19 U07.1    Late onset Alzheimer's dementia without behavioral disturbance (HCC) G30.1, F02.80    Macular degeneration H35.30    Vaginal pain R10.2    Hyperparathyroidism ([address] E21.3    Osteoporosis M81.0    History of onychomycosis Z86.19    Urinary incontinence R32    Dyspnea on exertion R06.09    Near syncope R55    Other hydronephrosis N13.39    Anxiety F41.9    Pruritus L29.9    Urine frequency R35.0    Laceration of right middle finger without foreign body without damage to nail S61.212A    Acute cystitis without hematuria N30.00    Medicare annual wellness visit, subsequent Z00.00    Seborrheic keratosis L82.1    Bilateral impacted cerumen H61.23    Dizziness R42    Speech disturbance R47.9    TIA (transient ischemic attack) G45.9    Dermatitis L30.9     History reviewed. No pertinent surgical history. Social History     Tobacco Use    Smoking status: Never    Smokeless tobacco: Never   Substance Use Topics    Alcohol use: Not Currently      Family History   Problem Relation Age of Onset    Dementia Other     Diabetes Other         Physical Exam   Vitals:       Visit Vitals  BP (!) 143/70 (BP 1 Location: Left upper arm, BP Patient Position: Sitting, BP Cuff Size: Small adult)   Pulse 77   Resp 14   Ht 5' 3\" (1.6 m)   Wt 131 lb 3.2 oz (59.5 kg)   SpO2 97%   BMI 23.24 kg/m²        Physical Exam  Constitutional:       General: She is not in acute distress. Appearance: She is well-developed.    HENT:      Ears:      Comments: Exam limited, didn't tolerate spectulum in ear canal, but no abnormality noted from what I could see  Eyes:      Extraocular Movements: Extraocular movements intact. Conjunctiva/sclera: Conjunctivae normal.   Cardiovascular:      Rate and Rhythm: Normal rate and regular rhythm. Pulses: Normal pulses. Heart sounds: No murmur heard. No friction rub. No gallop. Pulmonary:      Effort: No respiratory distress. Breath sounds: No wheezing, rhonchi or rales. Abdominal:      General: Bowel sounds are normal. There is no distension. Palpations: Abdomen is soft. There is no hepatomegaly, splenomegaly or mass. Tenderness: There is no abdominal tenderness. There is no guarding. Musculoskeletal:      Cervical back: Neck supple. Skin:     General: Skin is warm. Findings: No rash. Neurological:      Mental Status: She is alert.

## 2023-05-14 ENCOUNTER — TELEPHONE (OUTPATIENT)
Age: 88
End: 2023-05-14

## 2023-05-14 RX ORDER — NIRMATRELVIR AND RITONAVIR 300-100 MG
KIT ORAL
Qty: 30 TABLET | Refills: 0 | Status: SHIPPED | OUTPATIENT
Start: 2023-05-14

## 2023-05-15 ENCOUNTER — TELEPHONE (OUTPATIENT)
Age: 88
End: 2023-05-15

## 2023-08-23 ENCOUNTER — HOSPITAL ENCOUNTER (EMERGENCY)
Facility: HOSPITAL | Age: 88
Discharge: HOME | End: 2023-08-23
Attending: STUDENT IN AN ORGANIZED HEALTH CARE EDUCATION/TRAINING PROGRAM
Payer: MEDICARE

## 2023-08-23 ENCOUNTER — APPOINTMENT (OUTPATIENT)
Facility: HOSPITAL | Age: 88
End: 2023-08-23
Payer: MEDICARE

## 2023-08-23 VITALS
OXYGEN SATURATION: 100 % | BODY MASS INDEX: 19.49 KG/M2 | TEMPERATURE: 97.7 F | SYSTOLIC BLOOD PRESSURE: 126 MMHG | DIASTOLIC BLOOD PRESSURE: 101 MMHG | WEIGHT: 110 LBS | RESPIRATION RATE: 19 BRPM | HEIGHT: 63 IN | HEART RATE: 71 BPM

## 2023-08-23 DIAGNOSIS — R10.9 ACUTE ABDOMINAL PAIN: Primary | ICD-10-CM

## 2023-08-23 DIAGNOSIS — N30.00 ACUTE CYSTITIS WITHOUT HEMATURIA: ICD-10-CM

## 2023-08-23 LAB
ALBUMIN SERPL-MCNC: 3.9 G/DL (ref 3.5–5)
ALBUMIN/GLOB SERPL: 1.1 (ref 1.1–2.2)
ALP SERPL-CCNC: 49 U/L (ref 45–117)
ALT SERPL-CCNC: 17 U/L (ref 12–78)
ANION GAP SERPL CALC-SCNC: 5 MMOL/L (ref 5–15)
APPEARANCE UR: ABNORMAL
AST SERPL-CCNC: 23 U/L (ref 15–37)
BACTERIA URNS QL MICRO: NEGATIVE /HPF
BASOPHILS # BLD: 0 K/UL (ref 0–0.1)
BASOPHILS NFR BLD: 0 % (ref 0–1)
BILIRUB SERPL-MCNC: 0.6 MG/DL (ref 0.2–1)
BILIRUB UR QL CFM: NEGATIVE
BUN SERPL-MCNC: 17 MG/DL (ref 6–20)
BUN/CREAT SERPL: 17 (ref 12–20)
CALCIUM SERPL-MCNC: 11.5 MG/DL (ref 8.5–10.1)
CHLORIDE SERPL-SCNC: 108 MMOL/L (ref 97–108)
CO2 SERPL-SCNC: 25 MMOL/L (ref 21–32)
COLOR UR: ABNORMAL
CREAT SERPL-MCNC: 0.99 MG/DL (ref 0.55–1.02)
DIFFERENTIAL METHOD BLD: ABNORMAL
EOSINOPHIL # BLD: 0 K/UL (ref 0–0.4)
EOSINOPHIL NFR BLD: 0 % (ref 0–7)
EPITH CASTS URNS QL MICRO: ABNORMAL /LPF
ERYTHROCYTE [DISTWIDTH] IN BLOOD BY AUTOMATED COUNT: 13.5 % (ref 11.5–14.5)
GLOBULIN SER CALC-MCNC: 3.5 G/DL (ref 2–4)
GLUCOSE SERPL-MCNC: 123 MG/DL (ref 65–100)
GLUCOSE UR STRIP.AUTO-MCNC: NEGATIVE MG/DL
HCT VFR BLD AUTO: 35.8 % (ref 35–47)
HGB BLD-MCNC: 11.5 G/DL (ref 11.5–16)
HGB UR QL STRIP: NEGATIVE
HYALINE CASTS URNS QL MICRO: ABNORMAL /LPF (ref 0–5)
IMM GRANULOCYTES # BLD AUTO: 0 K/UL (ref 0–0.04)
IMM GRANULOCYTES NFR BLD AUTO: 0 % (ref 0–0.5)
KETONES UR QL STRIP.AUTO: 15 MG/DL
LEUKOCYTE ESTERASE UR QL STRIP.AUTO: ABNORMAL
LIPASE SERPL-CCNC: 119 U/L (ref 73–393)
LYMPHOCYTES # BLD: 0.7 K/UL (ref 0.8–3.5)
LYMPHOCYTES NFR BLD: 7 % (ref 12–49)
MCH RBC QN AUTO: 30 PG (ref 26–34)
MCHC RBC AUTO-ENTMCNC: 32.1 G/DL (ref 30–36.5)
MCV RBC AUTO: 93.5 FL (ref 80–99)
MONOCYTES # BLD: 0.4 K/UL (ref 0–1)
MONOCYTES NFR BLD: 4 % (ref 5–13)
NEUTS SEG # BLD: 8.5 K/UL (ref 1.8–8)
NEUTS SEG NFR BLD: 89 % (ref 32–75)
NITRITE UR QL STRIP.AUTO: NEGATIVE
NRBC # BLD: 0 K/UL (ref 0–0.01)
NRBC BLD-RTO: 0 PER 100 WBC
PH UR STRIP: 6 (ref 5–8)
PLATELET # BLD AUTO: 228 K/UL (ref 150–400)
PLATELET COMMENT: ABNORMAL
PMV BLD AUTO: 11.3 FL (ref 8.9–12.9)
POTASSIUM SERPL-SCNC: 4.4 MMOL/L (ref 3.5–5.1)
PROT SERPL-MCNC: 7.4 G/DL (ref 6.4–8.2)
PROT UR STRIP-MCNC: ABNORMAL MG/DL
RBC # BLD AUTO: 3.83 M/UL (ref 3.8–5.2)
RBC #/AREA URNS HPF: ABNORMAL /HPF (ref 0–5)
RBC MORPH BLD: ABNORMAL
SODIUM SERPL-SCNC: 138 MMOL/L (ref 136–145)
SP GR UR REFRACTOMETRY: 1.02 (ref 1–1.03)
UROBILINOGEN UR QL STRIP.AUTO: 2 EU/DL (ref 0.2–1)
WBC # BLD AUTO: 9.6 K/UL (ref 3.6–11)
WBC URNS QL MICRO: ABNORMAL /HPF (ref 0–4)

## 2023-08-23 PROCEDURE — 85025 COMPLETE CBC W/AUTO DIFF WBC: CPT

## 2023-08-23 PROCEDURE — 81001 URINALYSIS AUTO W/SCOPE: CPT

## 2023-08-23 PROCEDURE — 80053 COMPREHEN METABOLIC PANEL: CPT

## 2023-08-23 PROCEDURE — 6370000000 HC RX 637 (ALT 250 FOR IP): Performed by: EMERGENCY MEDICINE

## 2023-08-23 PROCEDURE — 83690 ASSAY OF LIPASE: CPT

## 2023-08-23 PROCEDURE — 99284 EMERGENCY DEPT VISIT MOD MDM: CPT

## 2023-08-23 PROCEDURE — 74176 CT ABD & PELVIS W/O CONTRAST: CPT

## 2023-08-23 PROCEDURE — 36415 COLL VENOUS BLD VENIPUNCTURE: CPT

## 2023-08-23 RX ORDER — 0.9 % SODIUM CHLORIDE 0.9 %
1000 INTRAVENOUS SOLUTION INTRAVENOUS ONCE
Status: DISCONTINUED | OUTPATIENT
Start: 2023-08-23 | End: 2023-08-23 | Stop reason: HOSPADM

## 2023-08-23 RX ORDER — CEPHALEXIN 500 MG/1
500 CAPSULE ORAL 3 TIMES DAILY
Qty: 21 CAPSULE | Refills: 0 | Status: SHIPPED | OUTPATIENT
Start: 2023-08-23 | End: 2023-08-30

## 2023-08-23 RX ORDER — CEPHALEXIN 250 MG/1
500 CAPSULE ORAL
Status: COMPLETED | OUTPATIENT
Start: 2023-08-23 | End: 2023-08-23

## 2023-08-23 RX ADMIN — CEPHALEXIN 500 MG: 250 CAPSULE ORAL at 21:23

## 2023-08-23 ASSESSMENT — PAIN - FUNCTIONAL ASSESSMENT: PAIN_FUNCTIONAL_ASSESSMENT: NONE - DENIES PAIN

## 2023-08-23 NOTE — ED PROVIDER NOTES
0.4 K/UL    Basophils Absolute 0.0 0.0 - 0.1 K/UL    Absolute Immature Granulocyte 0.0 0.00 - 0.04 K/UL    Differential Type SMEAR SCANNED      Platelet Comment Large Platelets      RBC Comment OVALOCYTES  PRESENT          CT ABDOMEN PELVIS WO CONTRAST Additional Contrast? None   Final Result   No acute process. Procedures - none unless documented below      [place] and imaging reviewed. Abdomen is nontender and she reported feeling better after her bowel movement, however son reported some chronic weight loss and decreased appetite so obtained abdominal CT ultimately negative for bowel obstruction or infectious process. UA pending. Once resulted anticipate she can be discharged home to follow up with her primary physician. Patient signed out to Dr. Sage Cruz.       FINAL IMPRESSION AND DISPOSITION   Impression: Acute abdominal pain  Disposition: Pending       Román Cook MD  08/23/23 1100

## 2023-08-23 NOTE — ED TRIAGE NOTES
Patient arrived via EMS from [facility]. Patient c/o generalized abd pain with unknown last BM. Patient had large BM on arrival and \"feels better. \" Patient has had poor appetite and 20 pound weight loss over the last couple months     Hx dementia

## 2023-08-24 NOTE — ED NOTES
Patient does not appear to be in any acute distress/shows no evidence of clinical instability at this time. Provider has reviewed discharge instructions with the patient/family. The patient/family verbalized understanding instructions as well as need for follow up for any further symptoms. Discharge papers given, education provided, and any questions answered. Patient discharged by provider.        Cm Cooper RN  08/23/23 2910

## 2024-12-22 ENCOUNTER — APPOINTMENT (OUTPATIENT)
Facility: HOSPITAL | Age: 88
End: 2024-12-22
Payer: MEDICARE

## 2024-12-22 ENCOUNTER — HOSPITAL ENCOUNTER (EMERGENCY)
Facility: HOSPITAL | Age: 88
Discharge: HOME | End: 2024-12-22
Attending: EMERGENCY MEDICINE
Payer: MEDICARE

## 2024-12-22 VITALS
HEART RATE: 68 BPM | OXYGEN SATURATION: 99 % | WEIGHT: 130 LBS | RESPIRATION RATE: 18 BRPM | BODY MASS INDEX: 23.04 KG/M2 | TEMPERATURE: 98.2 F | SYSTOLIC BLOOD PRESSURE: 115 MMHG | DIASTOLIC BLOOD PRESSURE: 62 MMHG | HEIGHT: 63 IN

## 2024-12-22 DIAGNOSIS — G51.0 BELL'S PALSY: ICD-10-CM

## 2024-12-22 DIAGNOSIS — B02.30 HERPES ZOSTER OPHTHALMICUS: Primary | ICD-10-CM

## 2024-12-22 LAB
ALBUMIN SERPL-MCNC: 3.3 G/DL (ref 3.5–5)
ALBUMIN/GLOB SERPL: 0.9 (ref 1.1–2.2)
ALP SERPL-CCNC: 63 U/L (ref 45–117)
ALT SERPL-CCNC: 14 U/L (ref 12–78)
ANION GAP SERPL CALC-SCNC: 4 MMOL/L (ref 2–12)
APPEARANCE UR: CLEAR
AST SERPL-CCNC: 13 U/L (ref 15–37)
BACTERIA URNS QL MICRO: NEGATIVE /HPF
BASOPHILS # BLD: 0.1 K/UL (ref 0–0.1)
BASOPHILS NFR BLD: 1 % (ref 0–1)
BILIRUB SERPL-MCNC: 0.2 MG/DL (ref 0.2–1)
BILIRUB UR QL: NEGATIVE
BUN SERPL-MCNC: 22 MG/DL (ref 6–20)
BUN/CREAT SERPL: 21 (ref 12–20)
CALCIUM SERPL-MCNC: 10.6 MG/DL (ref 8.5–10.1)
CHLORIDE SERPL-SCNC: 110 MMOL/L (ref 97–108)
CO2 SERPL-SCNC: 26 MMOL/L (ref 21–32)
COLOR UR: ABNORMAL
CREAT SERPL-MCNC: 1.06 MG/DL (ref 0.55–1.02)
DIFFERENTIAL METHOD BLD: ABNORMAL
EOSINOPHIL # BLD: 0.1 K/UL (ref 0–0.4)
EOSINOPHIL NFR BLD: 3 % (ref 0–7)
EPITH CASTS URNS QL MICRO: ABNORMAL /LPF
ERYTHROCYTE [DISTWIDTH] IN BLOOD BY AUTOMATED COUNT: 13 % (ref 11.5–14.5)
GLOBULIN SER CALC-MCNC: 3.7 G/DL (ref 2–4)
GLUCOSE SERPL-MCNC: 124 MG/DL (ref 65–100)
GLUCOSE UR STRIP.AUTO-MCNC: NEGATIVE MG/DL
HCT VFR BLD AUTO: 31.6 % (ref 35–47)
HGB BLD-MCNC: 10.2 G/DL (ref 11.5–16)
HGB UR QL STRIP: NEGATIVE
HYALINE CASTS URNS QL MICRO: ABNORMAL /LPF (ref 0–2)
IMM GRANULOCYTES # BLD AUTO: 0 K/UL (ref 0–0.04)
IMM GRANULOCYTES NFR BLD AUTO: 0 % (ref 0–0.5)
KETONES UR QL STRIP.AUTO: ABNORMAL MG/DL
LEUKOCYTE ESTERASE UR QL STRIP.AUTO: NEGATIVE
LYMPHOCYTES # BLD: 1.1 K/UL (ref 0.8–3.5)
LYMPHOCYTES NFR BLD: 21 % (ref 12–49)
MCH RBC QN AUTO: 30.6 PG (ref 26–34)
MCHC RBC AUTO-ENTMCNC: 32.3 G/DL (ref 30–36.5)
MCV RBC AUTO: 94.9 FL (ref 80–99)
MONOCYTES # BLD: 0.4 K/UL (ref 0–1)
MONOCYTES NFR BLD: 9 % (ref 5–13)
NEUTS SEG # BLD: 3.3 K/UL (ref 1.8–8)
NEUTS SEG NFR BLD: 66 % (ref 32–75)
NITRITE UR QL STRIP.AUTO: NEGATIVE
NRBC # BLD: 0 K/UL (ref 0–0.01)
NRBC BLD-RTO: 0 PER 100 WBC
PH UR STRIP: 7 (ref 5–8)
PLATELET # BLD AUTO: 207 K/UL (ref 150–400)
PMV BLD AUTO: 10.8 FL (ref 8.9–12.9)
POTASSIUM SERPL-SCNC: 4.4 MMOL/L (ref 3.5–5.1)
PROT SERPL-MCNC: 7 G/DL (ref 6.4–8.2)
PROT UR STRIP-MCNC: NEGATIVE MG/DL
RBC # BLD AUTO: 3.33 M/UL (ref 3.8–5.2)
RBC #/AREA URNS HPF: ABNORMAL /HPF (ref 0–5)
SODIUM SERPL-SCNC: 140 MMOL/L (ref 136–145)
SP GR UR REFRACTOMETRY: 1.02 (ref 1–1.03)
URINE CULTURE IF INDICATED: ABNORMAL
UROBILINOGEN UR QL STRIP.AUTO: 1 EU/DL (ref 0.2–1)
WBC # BLD AUTO: 5 K/UL (ref 3.6–11)
WBC URNS QL MICRO: ABNORMAL /HPF (ref 0–4)

## 2024-12-22 PROCEDURE — 6370000000 HC RX 637 (ALT 250 FOR IP): Performed by: EMERGENCY MEDICINE

## 2024-12-22 PROCEDURE — 70450 CT HEAD/BRAIN W/O DYE: CPT

## 2024-12-22 PROCEDURE — 6360000002 HC RX W HCPCS: Performed by: EMERGENCY MEDICINE

## 2024-12-22 PROCEDURE — 2580000003 HC RX 258: Performed by: EMERGENCY MEDICINE

## 2024-12-22 PROCEDURE — 96365 THER/PROPH/DIAG IV INF INIT: CPT

## 2024-12-22 PROCEDURE — 96366 THER/PROPH/DIAG IV INF ADDON: CPT

## 2024-12-22 PROCEDURE — 80053 COMPREHEN METABOLIC PANEL: CPT

## 2024-12-22 PROCEDURE — 51701 INSERT BLADDER CATHETER: CPT

## 2024-12-22 PROCEDURE — 36415 COLL VENOUS BLD VENIPUNCTURE: CPT

## 2024-12-22 PROCEDURE — 99284 EMERGENCY DEPT VISIT MOD MDM: CPT

## 2024-12-22 PROCEDURE — 81001 URINALYSIS AUTO W/SCOPE: CPT

## 2024-12-22 PROCEDURE — 85025 COMPLETE CBC W/AUTO DIFF WBC: CPT

## 2024-12-22 RX ORDER — DIPHENHYDRAMINE HCL 25 MG
1 CAPSULE ORAL EVERY 4 HOURS
Qty: 15 ML | Refills: 0 | Status: SHIPPED | OUTPATIENT
Start: 2024-12-22

## 2024-12-22 RX ORDER — TETRACAINE HYDROCHLORIDE 5 MG/ML
1 SOLUTION OPHTHALMIC
Status: COMPLETED | OUTPATIENT
Start: 2024-12-22 | End: 2024-12-22

## 2024-12-22 RX ORDER — VALACYCLOVIR HYDROCHLORIDE 1 G/1
1000 TABLET, FILM COATED ORAL 3 TIMES DAILY
Qty: 21 TABLET | Refills: 0 | Status: SHIPPED | OUTPATIENT
Start: 2024-12-22 | End: 2024-12-29

## 2024-12-22 RX ADMIN — FLUORESCEIN SODIUM 1 MG: 1 STRIP OPHTHALMIC at 13:24

## 2024-12-22 RX ADMIN — ACYCLOVIR SODIUM 500 MG: 50 INJECTION, SOLUTION INTRAVENOUS at 21:57

## 2024-12-22 RX ADMIN — ACYCLOVIR SODIUM 500 MG: 1000 INJECTION, SOLUTION INTRAVENOUS at 13:31

## 2024-12-22 RX ADMIN — TETRACAINE HYDROCHLORIDE 1 DROP: 5 SOLUTION OPHTHALMIC at 13:24

## 2024-12-22 ASSESSMENT — PAIN DESCRIPTION - ORIENTATION: ORIENTATION: LEFT

## 2024-12-22 ASSESSMENT — PAIN SCALES - GENERAL: PAINLEVEL_OUTOF10: 5

## 2024-12-22 ASSESSMENT — PAIN - FUNCTIONAL ASSESSMENT: PAIN_FUNCTIONAL_ASSESSMENT: 0-10

## 2024-12-22 ASSESSMENT — PAIN DESCRIPTION - LOCATION: LOCATION: EYE

## 2024-12-22 NOTE — ED PROVIDER NOTES
[phone]  Schedule an appointment as soon as possible for a visit         DISCHARGE MEDICATIONS:  Discharge Medication List as of 12/22/2024  9:44 PM        START taking these medications    Details   valACYclovir (VALTREX) 1 g tablet Take 1 tablet by mouth 3 times daily for 7 days, Disp-21 tablet, R-0Normal      dextran 70-hypromellose (ARTIFICIAL TEARS) 0.1-0.3 % SOLN opthalmic solution Place 1 drop into both eyes every 4 hours, Disp-15 mL, R-0Normal               (Please note that portions of this note were completed with a voice recognition program.  Efforts were made to edit the dictations but occasionally words are mis-transcribed.)    Maxwell Guillory MD (electronically signed)  Emergency Attending Physician / Physician Assistant / Nurse Practitioner            Maxwell Guillory MD  12/23/24 9518

## 2024-12-23 NOTE — ED NOTES
Discharge paper works given to patient with son on bedside. Patient is not in pain. Patient is ambulatory.

## 2024-12-23 NOTE — DISCHARGE INSTRUCTIONS
You were seen in the emergency department for facial weakness and left eye pain.  The results of your tests were consistent with herpes zoster ophthalmicus causing a Bell's palsy. Please take any medications prescribed at this visit as instructed.  Please follow-up with your PCP and ophthalmologist or return to the emergency department if you experience a worsening of symptoms or any new symptoms that are concerning to you.